# Patient Record
Sex: MALE | Race: WHITE | NOT HISPANIC OR LATINO | Employment: OTHER | URBAN - METROPOLITAN AREA
[De-identification: names, ages, dates, MRNs, and addresses within clinical notes are randomized per-mention and may not be internally consistent; named-entity substitution may affect disease eponyms.]

---

## 2017-02-10 ENCOUNTER — HOSPITAL ENCOUNTER (INPATIENT)
Facility: HOSPITAL | Age: 52
LOS: 7 days | Discharge: HOME | End: 2017-02-18
Attending: EMERGENCY MEDICINE | Admitting: INTERNAL MEDICINE
Payer: MEDICAID

## 2017-02-10 DIAGNOSIS — N17.9 ACUTE RENAL FAILURE, UNSPECIFIED ACUTE RENAL FAILURE TYPE: ICD-10-CM

## 2017-02-10 DIAGNOSIS — D68.9 COAGULOPATHY: ICD-10-CM

## 2017-02-10 DIAGNOSIS — E87.6 HYPOKALEMIA: ICD-10-CM

## 2017-02-10 DIAGNOSIS — D53.9 MACROCYTIC ANEMIA: ICD-10-CM

## 2017-02-10 DIAGNOSIS — F10.10 ETOH ABUSE: ICD-10-CM

## 2017-02-10 DIAGNOSIS — E55.9 VITAMIN D INSUFFICIENCY: ICD-10-CM

## 2017-02-10 DIAGNOSIS — K74.60 DECOMPENSATED HEPATIC CIRRHOSIS: ICD-10-CM

## 2017-02-10 DIAGNOSIS — D69.6 THROMBOCYTOPENIA, UNSPECIFIED: ICD-10-CM

## 2017-02-10 DIAGNOSIS — E83.52 HYPERCALCEMIA: ICD-10-CM

## 2017-02-10 DIAGNOSIS — K72.90 DECOMPENSATED HEPATIC CIRRHOSIS: ICD-10-CM

## 2017-02-10 DIAGNOSIS — K76.82 HEPATIC ENCEPHALOPATHY: ICD-10-CM

## 2017-02-10 DIAGNOSIS — E43 PROTEIN-CALORIE MALNUTRITION, SEVERE: ICD-10-CM

## 2017-02-10 DIAGNOSIS — E87.1 HYPONATREMIA: Primary | ICD-10-CM

## 2017-02-10 PROCEDURE — 93005 ELECTROCARDIOGRAM TRACING: CPT

## 2017-02-10 PROCEDURE — 96372 THER/PROPH/DIAG INJ SC/IM: CPT

## 2017-02-10 PROCEDURE — 96361 HYDRATE IV INFUSION ADD-ON: CPT

## 2017-02-10 PROCEDURE — 86701 HIV-1ANTIBODY: CPT

## 2017-02-10 PROCEDURE — 20600001 HC STEP DOWN PRIVATE ROOM

## 2017-02-10 PROCEDURE — 82962 GLUCOSE BLOOD TEST: CPT

## 2017-02-10 PROCEDURE — 99285 EMERGENCY DEPT VISIT HI MDM: CPT | Mod: 25

## 2017-02-10 PROCEDURE — 96368 THER/DIAG CONCURRENT INF: CPT

## 2017-02-10 PROCEDURE — 96366 THER/PROPH/DIAG IV INF ADDON: CPT

## 2017-02-10 PROCEDURE — 99285 EMERGENCY DEPT VISIT HI MDM: CPT | Mod: ,,, | Performed by: EMERGENCY MEDICINE

## 2017-02-10 PROCEDURE — 86803 HEPATITIS C AB TEST: CPT

## 2017-02-10 PROCEDURE — 96365 THER/PROPH/DIAG IV INF INIT: CPT

## 2017-02-10 NOTE — IP AVS SNAPSHOT
[facility]  [address]  [facility] [address]  Phone: [phone]           Patient Discharge Instructions     Our goal is to set you up for success. This packet includes information on your condition, medications, and your home care. It will help you to care for yourself so you don't get sicker and need to go back to the hospital.     Please ask your nurse if you have any questions.        There are many details to remember when preparing to leave the hospital. Here is what you will need to do:    1. Take your medicine. If you are prescribed medications, review your Medication List in the following pages. You may have new medications to  at the pharmacy and others that you'll need to stop taking. Review the instructions for how and when to take your medications. Talk with your doctor or nurses if you are unsure of what to do.     2. Go to your follow-up appointments. Specific follow-up information is listed in the following pages. Your may be contacted by a transition nurse or clinical provider about future appointments. Be sure we have all of the phone numbers to reach you, if needed. Please contact your provider's office if you are unable to make an appointment.     3. Watch for warning signs. Your doctor or nurse will give you detailed warning signs to watch for and when to call for assistance. These instructions may also include educational information about your condition. If you experience any of warning signs to your health, call your doctor.               [facility]  Unless otherwise directed by your provider, please contact [facility], our nurse care line that is available for 24/7 assistance.     [phone] (toll-free)    Registered nurses in the [facility] provide clinical advisement, health education, appointment booking, and other advisory services.                    ** Verify the list of medication(s) below is accurate and up  to date. Carry this with you in case of emergency. If your medications have changed, please notify your healthcare provider.             Medication List      START taking these medications        Additional Info                      ergocalciferol 50,000 unit Cap   Commonly known as:  ERGOCALCIFEROL   Quantity:  12 capsule   Refills:  6   Dose:  48101 Units    Last time this was given:  50,000 Units on 2/15/2017  8:25 AM   Instructions:  Take 1 capsule (50,000 Units total) by mouth every 30 days.     Begin Date    AM    Noon    PM    Bedtime       lactulose 20 gram/30 mL Soln   Commonly known as:  CHRONULAC   Quantity:  600 mL   Refills:  12   Dose:  20 g    Last time this was given:  20 g on 2/18/2017  8:54 AM   Instructions:  Take 30 mLs (20 g total) by mouth 3 (three) times daily as needed (to acheive 2-3 BM per day).     Begin Date    AM    Noon    PM    Bedtime       metoprolol tartrate 25 MG tablet   Commonly known as:  LOPRESSOR   Quantity:  30 tablet   Refills:  11   Dose:  12.5 mg    Last time this was given:  12.5 mg on 2/18/2017  8:55 AM   Instructions:  Take 0.5 tablets (12.5 mg total) by mouth 2 (two) times daily.     Begin Date    AM    Noon    PM    Bedtime       multivitamin tablet   Commonly known as:  THERAGRAN   Refills:  0   Dose:  1 tablet    Last time this was given:  1 tablet on 2/18/2017  8:56 AM   Instructions:  Take 1 tablet by mouth once daily.     Begin Date    AM    Noon    PM    Bedtime       tamsulosin 0.4 mg Cp24   Commonly known as:  FLOMAX   Quantity:  30 capsule   Refills:  11   Dose:  0.4 mg    Last time this was given:  0.4 mg on 2/18/2017  8:56 AM   Instructions:  Take 1 capsule (0.4 mg total) by mouth once daily.     Begin Date    AM    Noon    PM    Bedtime            Where to Get Your Medications      These medications were sent to [facility] [address] [place] [address], [place] - [address]  [address], [facility] [place] [place] [address]     Phone:  [phone]     ergocalciferol 50,000 unit Cap     "lactulose 20 gram/30 mL Soln    metoprolol tartrate 25 MG tablet    tamsulosin 0.4 mg Cp24         You can get these medications from any pharmacy     You don't need a prescription for these medications     multivitamin tablet                  Please bring to all follow up appointments:    1. A copy of your discharge instructions.  2. All medicines you are currently taking in their original bottles.  3. Identification and insurance card.    Please arrive 15 minutes ahead of scheduled appointment time.    Please call 24 hours in advance if you must reschedule your appointment and/or time.            Primary Diagnosis     Your primary diagnosis was:  Decompensated Hepatic Cirrhosis      Admission Information     Date & Time Provider Department CSN    2/10/2017 11:46 PM Daniela Whiteside MD [facility] [id number]      Care Providers     Provider Role Specialty Primary office phone    Daniela Whiteside MD Attending Provider Hospitalist [phone]    Daniela Whiteside MD Team Attending  Hospitalist [phone]      Your Vitals Were     BP Pulse Temp Resp Height Weight    95/58 (BP Location: Right arm, Patient Position: Lying, BP Method: Automatic) 104 98.8 °F (37.1 °C) (Oral) 16 5' 8" (1.727 m) 81.6 kg (180 lb)    SpO2 BMI             95% 27.37 kg/m2         Recent Lab Values     No lab values to display.      Pending Labs     Order Current Status    CBC auto differential Preliminary result    Culture, Anaerobe Preliminary result    Prepare Fresh Frozen Plasma 1 Unit Preliminary result    Prepare RBC 1 Unit Preliminary result      Allergies as of 2/18/2017     No Known Allergies      Advance Directives     An advance directive is a document which, in the event you are no longer able to make decisions for yourself, tells your healthcare team what kind of treatment you do or do not want to receive, or who you would like to make those decisions for you.  If you do not currently have an advance " directive, [facility] encourages you to create one.  For more information call:  [phone]), [phone] ([phone]),  or log on to [email or web address].        Smoking Cessation     If you would like to quit smoking:   You may be eligible for free services if you are a [place] resident and started smoking cigarettes before September 1, 1988.  Call the Smoking Cessation Trust ([facility]) toll free at [phone] [phone] or (829) 909-6518.   Call 2-933-QUIT-NOW if you do not meet the above criteria.            Language Assistance Services     ATTENTION: Language assistance services are available, free of charge. Please call [phone].      ATENCIÓN: Si habla zainab, tiene a loaiza disposición servicios gratuitos de asistencia lingüística. Llame al [phone].     CHÚ Ý: N?u b?n nói Ti?ng Vi?t, có các d?ch v? h? tr? ngôn ng? mi?n phí dành cho b?n. G?i s? [phone].        Blood Transfusion Reaction Signs and Symptoms     The blood you have received has been matched for you as carefully as possible. Most patients who receive a blood transfusion do not experience problems. However, there can be a delayed reaction that happens a few weeks after your blood transfusion. Contact your physician immediately if you experience any NEW SYMPTOMS listed below:     Fever greater than 100.4 degrees    Chills   Yellow color to your skin or eyes(Jaundice)   Back pain, chest pain, or pain at the infusion site   Weakness (more than usual)   Discomfort or uneasiness more than usual (Malaise)   Nausea or vomiting   Shortness of breath, wheezing, or coughing   Higher or lower blood pressure than normal   Skin rash, itching, skin redness, or localized swelling (example: hands or feet)   Urinating less than normal   Urine appears reddish or orange and is darker than normal      Remember that some these signs may already exist for you--such as having chronic back pain or high blood pressure. You only need  to look for and report to your doctor any new occurrences since your blood transfusion that are of concern.         [facility] complies with applicable Federal civil rights laws and does not discriminate on the basis of race, color, national origin, age, disability, or sex.

## 2017-02-11 PROBLEM — D68.9 COAGULOPATHY: Status: ACTIVE | Noted: 2017-02-11

## 2017-02-11 PROBLEM — E83.52 HYPERCALCEMIA: Status: ACTIVE | Noted: 2017-02-11

## 2017-02-11 PROBLEM — N17.9 ACUTE RENAL FAILURE: Status: ACTIVE | Noted: 2017-02-11

## 2017-02-11 PROBLEM — D64.9 ANEMIA: Status: ACTIVE | Noted: 2017-02-11

## 2017-02-11 PROBLEM — Z92.89 HISTORY OF RAPID STREP TEST: Status: ACTIVE | Noted: 2017-02-11

## 2017-02-11 PROBLEM — D53.9 MACROCYTIC ANEMIA: Status: ACTIVE | Noted: 2017-02-11

## 2017-02-11 PROBLEM — E87.6 HYPOKALEMIA: Status: ACTIVE | Noted: 2017-02-11

## 2017-02-11 PROBLEM — F10.10 ETOH ABUSE: Status: ACTIVE | Noted: 2017-02-11

## 2017-02-11 PROBLEM — K74.60 DECOMPENSATED HEPATIC CIRRHOSIS: Status: ACTIVE | Noted: 2017-02-11

## 2017-02-11 PROBLEM — K72.90 DECOMPENSATED HEPATIC CIRRHOSIS: Status: ACTIVE | Noted: 2017-02-11

## 2017-02-11 PROBLEM — E87.1 HYPONATREMIA: Status: ACTIVE | Noted: 2017-02-11

## 2017-02-11 PROBLEM — D69.6 THROMBOCYTOPENIA, UNSPECIFIED: Status: ACTIVE | Noted: 2017-02-11

## 2017-02-11 PROBLEM — K76.82 HEPATIC ENCEPHALOPATHY: Status: ACTIVE | Noted: 2017-02-11

## 2017-02-11 PROBLEM — E43 PROTEIN-CALORIE MALNUTRITION, SEVERE: Status: ACTIVE | Noted: 2017-02-11

## 2017-02-11 PROBLEM — E55.9 VITAMIN D INSUFFICIENCY: Status: ACTIVE | Noted: 2017-02-11

## 2017-02-11 LAB
25(OH)D3+25(OH)D2 SERPL-MCNC: 12 NG/ML
AFP SERPL-MCNC: 3.5 NG/ML
ALBUMIN SERPL BCP-MCNC: 1.5 G/DL
ALBUMIN SERPL BCP-MCNC: 1.6 G/DL
ALP SERPL-CCNC: 90 U/L
ALP SERPL-CCNC: 95 U/L
ALT SERPL W/O P-5'-P-CCNC: 27 U/L
ALT SERPL W/O P-5'-P-CCNC: 28 U/L
AMMONIA PLAS-SCNC: 52 UMOL/L
AMPHET+METHAMPHET UR QL: NEGATIVE
ANION GAP SERPL CALC-SCNC: 5 MMOL/L
ANION GAP SERPL CALC-SCNC: 6 MMOL/L
AST SERPL-CCNC: 65 U/L
AST SERPL-CCNC: 74 U/L
BARBITURATES UR QL SCN>200 NG/ML: NEGATIVE
BASOPHILS # BLD AUTO: 0.02 K/UL
BASOPHILS NFR BLD: 0.2 %
BENZODIAZ UR QL SCN>200 NG/ML: NEGATIVE
BILIRUB SERPL-MCNC: 3 MG/DL
BILIRUB SERPL-MCNC: 3.1 MG/DL
BILIRUB UR QL STRIP: NEGATIVE
BUN SERPL-MCNC: 24 MG/DL
BUN SERPL-MCNC: 25 MG/DL
BZE UR QL SCN: NEGATIVE
CA-I BLDV-SCNC: 1.36 MMOL/L
CALCIUM CREATININE RATIO: 0.03
CALCIUM SERPL-MCNC: 10.3 MG/DL
CALCIUM SERPL-MCNC: 10.4 MG/DL
CALCIUM UR-MCNC: 9.3 MG/DL
CANNABINOIDS UR QL SCN: NEGATIVE
CERULOPLASMIN SERPL-MCNC: 13 MG/DL
CHLORIDE SERPL-SCNC: 91 MMOL/L
CHLORIDE SERPL-SCNC: 93 MMOL/L
CLARITY UR REFRACT.AUTO: ABNORMAL
CO2 SERPL-SCNC: 31 MMOL/L
CO2 SERPL-SCNC: 31 MMOL/L
COLOR UR AUTO: ABNORMAL
CREAT SERPL-MCNC: 1.9 MG/DL
CREAT SERPL-MCNC: 2 MG/DL
CREAT UR-MCNC: 261 MG/DL
CREAT UR-MCNC: 261 MG/DL
CREAT UR-MCNC: 307 MG/DL
DIFFERENTIAL METHOD: ABNORMAL
EOSINOPHIL # BLD AUTO: 0.1 K/UL
EOSINOPHIL NFR BLD: 1.3 %
EOSINOPHIL URNS QL WRIGHT STN: NORMAL
ERYTHROCYTE [DISTWIDTH] IN BLOOD BY AUTOMATED COUNT: 20.7 %
EST. GFR  (AFRICAN AMERICAN): 43.1 ML/MIN/1.73 M^2
EST. GFR  (AFRICAN AMERICAN): 45.8 ML/MIN/1.73 M^2
EST. GFR  (NON AFRICAN AMERICAN): 37.3 ML/MIN/1.73 M^2
EST. GFR  (NON AFRICAN AMERICAN): 39.7 ML/MIN/1.73 M^2
FERRITIN SERPL-MCNC: 107 NG/ML
FOLATE SERPL-MCNC: 17.8 NG/ML
GLUCOSE SERPL-MCNC: 101 MG/DL
GLUCOSE SERPL-MCNC: 86 MG/DL
GLUCOSE UR QL STRIP: NEGATIVE
HCT VFR BLD AUTO: 24.4 %
HETEROPH AB SERPL QL IA: NEGATIVE
HGB BLD-MCNC: 8.8 G/DL
HGB UR QL STRIP: ABNORMAL
HIV1+2 IGG SERPL QL IA.RAPID: NEGATIVE
INR PPP: 2.4
INR PPP: 2.4
KETONES UR QL STRIP: NEGATIVE
LACTATE SERPL-SCNC: 2.7 MMOL/L
LEUKOCYTE ESTERASE UR QL STRIP: NEGATIVE
LIPASE SERPL-CCNC: 115 U/L
LYMPHOCYTES # BLD AUTO: 2.2 K/UL
LYMPHOCYTES NFR BLD: 20.6 %
MCH RBC QN AUTO: 38.3 PG
MCHC RBC AUTO-ENTMCNC: 36.1 %
MCV RBC AUTO: 106 FL
METHADONE UR QL SCN>300 NG/ML: NEGATIVE
MONOCYTES # BLD AUTO: 1 K/UL
MONOCYTES NFR BLD: 9.3 %
NEUTROPHILS # BLD AUTO: 7.3 K/UL
NEUTROPHILS NFR BLD: 67.8 %
NITRITE UR QL STRIP: NEGATIVE
OPIATES UR QL SCN: NEGATIVE
OSMOLALITY SERPL: 280 MOSM/KG
OSMOLALITY UR: 382 MOSM/KG
PCP UR QL SCN>25 NG/ML: NEGATIVE
PH UR STRIP: 5 [PH] (ref 5–8)
PHOSPHATE SERPL-MCNC: 3.4 MG/DL
PLATELET # BLD AUTO: 87 K/UL
PMV BLD AUTO: 10.3 FL
POCT GLUCOSE: 98 MG/DL (ref 70–110)
POTASSIUM SERPL-SCNC: 3 MMOL/L
POTASSIUM SERPL-SCNC: 3.1 MMOL/L
PROT SERPL-MCNC: 6.4 G/DL
PROT SERPL-MCNC: 6.7 G/DL
PROT UR QL STRIP: ABNORMAL
PROT UR-MCNC: 28 MG/DL
PROT/CREAT RATIO, UR: 0.11
PROTHROMBIN TIME: 23.7 SEC
PROTHROMBIN TIME: 23.9 SEC
PTH-INTACT SERPL-MCNC: 12 PG/ML
RBC # BLD AUTO: 2.3 M/UL
SODIUM SERPL-SCNC: 127 MMOL/L
SODIUM SERPL-SCNC: 130 MMOL/L
SODIUM UR-SCNC: <20 MMOL/L
SP GR UR STRIP: 1.02 (ref 1–1.03)
TOXICOLOGY INFORMATION: NORMAL
TRANSFERRIN SERPL-MCNC: 119 MG/DL
TSH SERPL DL<=0.005 MIU/L-ACNC: 1.4 UIU/ML
URN SPEC COLLECT METH UR: ABNORMAL
UROBILINOGEN UR STRIP-ACNC: NEGATIVE EU/DL
VIT B12 SERPL-MCNC: >2000 PG/ML
WBC # BLD AUTO: 10.7 K/UL

## 2017-02-11 PROCEDURE — 83605 ASSAY OF LACTIC ACID: CPT

## 2017-02-11 PROCEDURE — 82330 ASSAY OF CALCIUM: CPT

## 2017-02-11 PROCEDURE — 82390 ASSAY OF CERULOPLASMIN: CPT

## 2017-02-11 PROCEDURE — 25000003 PHARM REV CODE 250: Performed by: HOSPITALIST

## 2017-02-11 PROCEDURE — 82728 ASSAY OF FERRITIN: CPT

## 2017-02-11 PROCEDURE — 86256 FLUORESCENT ANTIBODY TITER: CPT

## 2017-02-11 PROCEDURE — 85610 PROTHROMBIN TIME: CPT

## 2017-02-11 PROCEDURE — 85610 PROTHROMBIN TIME: CPT | Mod: 91

## 2017-02-11 PROCEDURE — 80053 COMPREHEN METABOLIC PANEL: CPT

## 2017-02-11 PROCEDURE — 36415 COLL VENOUS BLD VENIPUNCTURE: CPT

## 2017-02-11 PROCEDURE — 84443 ASSAY THYROID STIM HORMONE: CPT

## 2017-02-11 PROCEDURE — 99233 SBSQ HOSP IP/OBS HIGH 50: CPT | Mod: ,,, | Performed by: INTERNAL MEDICINE

## 2017-02-11 PROCEDURE — 80074 ACUTE HEPATITIS PANEL: CPT

## 2017-02-11 PROCEDURE — 86308 HETEROPHILE ANTIBODY SCREEN: CPT

## 2017-02-11 PROCEDURE — 84466 ASSAY OF TRANSFERRIN: CPT

## 2017-02-11 PROCEDURE — 25000003 PHARM REV CODE 250: Performed by: INTERNAL MEDICINE

## 2017-02-11 PROCEDURE — 93010 ELECTROCARDIOGRAM REPORT: CPT | Mod: ,,, | Performed by: INTERNAL MEDICINE

## 2017-02-11 PROCEDURE — 82787 IGG 1 2 3 OR 4 EACH: CPT | Mod: 59

## 2017-02-11 PROCEDURE — 82340 ASSAY OF CALCIUM IN URINE: CPT

## 2017-02-11 PROCEDURE — 80053 COMPREHEN METABOLIC PANEL: CPT | Mod: 91

## 2017-02-11 PROCEDURE — 84156 ASSAY OF PROTEIN URINE: CPT

## 2017-02-11 PROCEDURE — 84300 ASSAY OF URINE SODIUM: CPT

## 2017-02-11 PROCEDURE — 82607 VITAMIN B-12: CPT

## 2017-02-11 PROCEDURE — 63600175 PHARM REV CODE 636 W HCPCS: Performed by: HOSPITALIST

## 2017-02-11 PROCEDURE — 87040 BLOOD CULTURE FOR BACTERIA: CPT | Mod: 59

## 2017-02-11 PROCEDURE — 25000003 PHARM REV CODE 250: Performed by: STUDENT IN AN ORGANIZED HEALTH CARE EDUCATION/TRAINING PROGRAM

## 2017-02-11 PROCEDURE — 80307 DRUG TEST PRSMV CHEM ANLYZR: CPT

## 2017-02-11 PROCEDURE — 87205 SMEAR GRAM STAIN: CPT

## 2017-02-11 PROCEDURE — 85025 COMPLETE CBC W/AUTO DIFF WBC: CPT

## 2017-02-11 PROCEDURE — 20600001 HC STEP DOWN PRIVATE ROOM

## 2017-02-11 PROCEDURE — 83690 ASSAY OF LIPASE: CPT

## 2017-02-11 PROCEDURE — 82140 ASSAY OF AMMONIA: CPT

## 2017-02-11 PROCEDURE — 99223 1ST HOSP IP/OBS HIGH 75: CPT | Mod: ,,, | Performed by: INTERNAL MEDICINE

## 2017-02-11 PROCEDURE — 83935 ASSAY OF URINE OSMOLALITY: CPT

## 2017-02-11 PROCEDURE — 84100 ASSAY OF PHOSPHORUS: CPT

## 2017-02-11 PROCEDURE — 81003 URINALYSIS AUTO W/O SCOPE: CPT

## 2017-02-11 PROCEDURE — 97802 MEDICAL NUTRITION INDIV IN: CPT

## 2017-02-11 PROCEDURE — 82105 ALPHA-FETOPROTEIN SERUM: CPT

## 2017-02-11 PROCEDURE — 82746 ASSAY OF FOLIC ACID SERUM: CPT

## 2017-02-11 PROCEDURE — 83930 ASSAY OF BLOOD OSMOLALITY: CPT

## 2017-02-11 PROCEDURE — 87081 CULTURE SCREEN ONLY: CPT

## 2017-02-11 PROCEDURE — 83970 ASSAY OF PARATHORMONE: CPT

## 2017-02-11 PROCEDURE — 82306 VITAMIN D 25 HYDROXY: CPT

## 2017-02-11 RX ORDER — ALBUMIN HUMAN 50 G/1000ML
25 SOLUTION INTRAVENOUS EVERY 8 HOURS
Status: DISCONTINUED | OUTPATIENT
Start: 2017-02-11 | End: 2017-02-12

## 2017-02-11 RX ORDER — LACTULOSE 10 G/15ML
20 SOLUTION ORAL 3 TIMES DAILY
Status: DISCONTINUED | OUTPATIENT
Start: 2017-02-11 | End: 2017-02-14

## 2017-02-11 RX ORDER — GLUCAGON 1 MG
1 KIT INJECTION
Status: DISCONTINUED | OUTPATIENT
Start: 2017-02-11 | End: 2017-02-18 | Stop reason: HOSPADM

## 2017-02-11 RX ORDER — POTASSIUM CHLORIDE 20 MEQ/15ML
60 SOLUTION ORAL ONCE
Status: DISCONTINUED | OUTPATIENT
Start: 2017-02-11 | End: 2017-02-11

## 2017-02-11 RX ORDER — POTASSIUM CHLORIDE 750 MG/1
10 CAPSULE, EXTENDED RELEASE ORAL DAILY
Status: DISCONTINUED | OUTPATIENT
Start: 2017-02-11 | End: 2017-02-14

## 2017-02-11 RX ORDER — LORAZEPAM 1 MG/1
1 TABLET ORAL EVERY 6 HOURS PRN
Status: DISCONTINUED | OUTPATIENT
Start: 2017-02-11 | End: 2017-02-11

## 2017-02-11 RX ORDER — HEPARIN SODIUM 5000 [USP'U]/ML
5000 INJECTION, SOLUTION INTRAVENOUS; SUBCUTANEOUS EVERY 8 HOURS
Status: DISCONTINUED | OUTPATIENT
Start: 2017-02-11 | End: 2017-02-11

## 2017-02-11 RX ORDER — LORAZEPAM 1 MG/1
1 TABLET ORAL EVERY 6 HOURS PRN
Status: COMPLETED | OUTPATIENT
Start: 2017-02-11 | End: 2017-02-16

## 2017-02-11 RX ORDER — FOLIC ACID 1 MG/1
1 TABLET ORAL DAILY
Status: DISCONTINUED | OUTPATIENT
Start: 2017-02-11 | End: 2017-02-18 | Stop reason: HOSPADM

## 2017-02-11 RX ORDER — POTASSIUM CHLORIDE 750 MG/1
50 CAPSULE, EXTENDED RELEASE ORAL DAILY
Status: DISCONTINUED | OUTPATIENT
Start: 2017-02-11 | End: 2017-02-14

## 2017-02-11 RX ORDER — IBUPROFEN 200 MG
24 TABLET ORAL
Status: DISCONTINUED | OUTPATIENT
Start: 2017-02-11 | End: 2017-02-18 | Stop reason: HOSPADM

## 2017-02-11 RX ORDER — IBUPROFEN 200 MG
16 TABLET ORAL
Status: DISCONTINUED | OUTPATIENT
Start: 2017-02-11 | End: 2017-02-18 | Stop reason: HOSPADM

## 2017-02-11 RX ORDER — MIDODRINE HYDROCHLORIDE 2.5 MG/1
2.5 TABLET ORAL 2 TIMES DAILY WITH MEALS
Status: DISCONTINUED | OUTPATIENT
Start: 2017-02-12 | End: 2017-02-12

## 2017-02-11 RX ORDER — CALCIUM CARBONATE 200(500)MG
200 TABLET,CHEWABLE ORAL
Status: COMPLETED | OUTPATIENT
Start: 2017-02-11 | End: 2017-02-11

## 2017-02-11 RX ORDER — CALCIUM CARBONATE 200(500)MG
500 TABLET,CHEWABLE ORAL DAILY PRN
Status: DISCONTINUED | OUTPATIENT
Start: 2017-02-11 | End: 2017-02-11

## 2017-02-11 RX ORDER — PANTOPRAZOLE SODIUM 40 MG/1
40 TABLET, DELAYED RELEASE ORAL DAILY
Status: DISCONTINUED | OUTPATIENT
Start: 2017-02-11 | End: 2017-02-18 | Stop reason: HOSPADM

## 2017-02-11 RX ORDER — ONDANSETRON 4 MG/1
4 TABLET, ORALLY DISINTEGRATING ORAL EVERY 8 HOURS PRN
Status: DISCONTINUED | OUTPATIENT
Start: 2017-02-11 | End: 2017-02-18 | Stop reason: HOSPADM

## 2017-02-11 RX ADMIN — FOLIC ACID 1 MG: 1 TABLET ORAL at 09:02

## 2017-02-11 RX ADMIN — SODIUM CHLORIDE 1000 ML: 0.9 INJECTION, SOLUTION INTRAVENOUS at 01:02

## 2017-02-11 RX ADMIN — LACTULOSE 20 G: 10 SOLUTION ORAL at 04:02

## 2017-02-11 RX ADMIN — THIAMINE HYDROCHLORIDE 100 MG: 100 INJECTION, SOLUTION INTRAMUSCULAR; INTRAVENOUS at 05:02

## 2017-02-11 RX ADMIN — LACTULOSE 20 G: 10 SOLUTION ORAL at 09:02

## 2017-02-11 RX ADMIN — CALCIUM CARBONATE (ANTACID) CHEW TAB 500 MG 500 MG: 500 CHEW TAB at 01:02

## 2017-02-11 RX ADMIN — POTASSIUM CHLORIDE 50 MEQ: 750 CAPSULE, EXTENDED RELEASE ORAL at 03:02

## 2017-02-11 RX ADMIN — PANTOPRAZOLE SODIUM 40 MG: 40 TABLET, DELAYED RELEASE ORAL at 10:02

## 2017-02-11 RX ADMIN — HEPARIN SODIUM 5000 UNITS: 5000 INJECTION, SOLUTION INTRAVENOUS; SUBCUTANEOUS at 05:02

## 2017-02-11 RX ADMIN — RIFAXIMIN 550 MG: 550 TABLET ORAL at 09:02

## 2017-02-11 RX ADMIN — LACTULOSE 20 G: 10 SOLUTION ORAL at 02:02

## 2017-02-11 RX ADMIN — POTASSIUM CHLORIDE 10 MEQ: 750 CAPSULE, EXTENDED RELEASE ORAL at 03:02

## 2017-02-11 RX ADMIN — THERA TABS 1 TABLET: TAB at 09:02

## 2017-02-11 RX ADMIN — CEFTRIAXONE 1 G: 1 INJECTION, SOLUTION INTRAVENOUS at 04:02

## 2017-02-11 NOTE — ASSESSMENT & PLAN NOTE
-Most likely 2/2 decompensated cirrhosis  -f/u serum osm, urine osm, urine sodium to determine hypo/hypero/iso-osmolar type of hyponatremia  -s/p IVF in ED, based on workup will determine fluid restriction vs diuresis strategy

## 2017-02-11 NOTE — ASSESSMENT & PLAN NOTE
-Unclear etiology, from limited history most likely 2/2 EtOH cirrhosis. Does not appear to have transaminitis elevation >1000k so tylenol, acute hep, shock, Budd Chiari less likely. Also consider autoimmune, morgan's, chronic viral hepatitis, hemachromatosis  -Hep panel pending  -IgG, anti smooth muscle ab, ceruloplasmin pending  -Ferritin pending  -US Liver w/ doppler pending  -Would like to perform paracentesis, unable to find family member or working number to obtain consent, currently no leukocytosis of fever  -Possibly acute strep throat could have precipitated decline, but other etiology more likely  -UA clear, CxR without acute cardiopulmonary abnormality, BCx pending  -CTX 1g q24h for SBP ppx  MELD-Na score: 31 at 2/11/2017 12:11 AM  MELD score: 27 at 2/11/2017 12:11 AM  Calculated from:  Serum Creatinine: 2.0 mg/dL at 2/11/2017 12:11 AM  Serum Sodium: 127 mmol/L at 2/11/2017 12:11 AM  Total Bilirubin: 3.1 mg/dL at 2/11/2017 12:11 AM  INR(ratio): 2.4 at 2/11/2017 12:11 AM  Age: 51 years  -Hepatology consult pending

## 2017-02-11 NOTE — NURSING
MD and team at bedside. Notified Attending MD and resident that pt is not tolerating the liquid PO potassium as ordered even when diluted in juice. Pt states that it irritates his throat and causes heartburn. Recommended micro-K capsules. Per MDs they will address issue when they are done in pt's room.

## 2017-02-11 NOTE — ED PROVIDER NOTES
Encounter Date: 2/10/2017       History     Chief Complaint   Patient presents with    Hyponatremia     pt transferred from [facility] for weakness, hyponatremia, HIV     Review of patient's allergies indicates:  Allergies not on file  HPI Comments: 52yo M with reported history of CVA, transferred from outside hospital for ICU evaluation, hepatology and ID evaluation. Per outside ED, pt presented with 3 days of generalized weakness. Pt does not offer history as to why he presented to ED this evening.  Outside hospital noted pt to be positive for rapid strep, elevated lactic acid, hyponatremic.  Pt does report being a daily drinker, a few beers a day, last drink weeks ago. He reports abdominal distention and leg swelling. Denies abdominal pain, nausea, vomiting, chest pain, shortness of breath. Per referring ED with h/o cirrhosis and HIV, but pt denies known HIV (+) status or any known diagnosed cause of liver dz    The history is provided by the EMS personnel. The history is limited by the condition of the patient.     Past Medical History   Diagnosis Date    Seizure disorder      No past medical history pertinent negatives.  Past Surgical History   Procedure Laterality Date    Right jaw       plate placement     History reviewed. No pertinent family history.  Social History   Substance Use Topics    Smoking status: Current Every Day Smoker     Packs/day: 0.50    Smokeless tobacco: None    Alcohol use Yes     Review of Systems   Constitutional: Negative for fever.   HENT: Negative for sore throat.    Respiratory: Negative for shortness of breath.    Cardiovascular: Positive for leg swelling. Negative for chest pain.   Gastrointestinal: Positive for abdominal distention and diarrhea. Negative for abdominal pain, nausea and vomiting.   Genitourinary: Negative for dysuria.   Musculoskeletal: Negative for back pain.   Skin: Negative for rash.   Neurological: Positive for weakness.   Hematological: Does not  bruise/bleed easily.       Physical Exam   Initial Vitals   BP Pulse Resp Temp SpO2   02/10/17 2341 02/10/17 2341 02/10/17 2341 -- 02/10/17 2341   90/60 96 18  99 %     Physical Exam    Constitutional: He appears well-developed. No distress.   Frail appearing   HENT:   Head: Normocephalic and atraumatic.   Mouth/Throat: Oropharynx is clear and moist.   Eyes: EOM are normal. Pupils are equal, round, and reactive to light.   Neck: Normal range of motion.   Cardiovascular: Regular rhythm and normal heart sounds.   No murmur heard.  tachycardic   Pulmonary/Chest: Breath sounds normal. No stridor. No respiratory distress. He has no wheezes. He has no rales.   Abdominal: Soft. Bowel sounds are normal. He exhibits distension. There is no tenderness. There is no rebound.   Musculoskeletal: He exhibits edema.   Lymphadenopathy:     He has no cervical adenopathy.   Neurological: He is alert.   Right facial droop. Moves all extremities. Oriented to self and location. Stated that year was 1997 and president is Kd. Mild asterixis         ED Course   Procedures  Labs Reviewed   CBC W/ AUTO DIFFERENTIAL - Abnormal; Notable for the following:        Result Value    RBC 2.30 (*)     Hemoglobin 8.8 (*)     Hematocrit 24.4 (*)      (*)     MCH 38.3 (*)     MCHC 36.1 (*)     RDW 20.7 (*)     Platelets 87 (*)     All other components within normal limits   COMPREHENSIVE METABOLIC PANEL - Abnormal; Notable for the following:     Sodium 127 (*)     Potassium 3.0 (*)     Chloride 91 (*)     CO2 31 (*)     BUN, Bld 25 (*)     Creatinine 2.0 (*)     Albumin 1.6 (*)     Total Bilirubin 3.1 (*)     AST 65 (*)     Anion Gap 5 (*)     eGFR if  43.1 (*)     eGFR if non  37.3 (*)     All other components within normal limits   LACTIC ACID, PLASMA - Abnormal; Notable for the following:     Lactate (Lactic Acid) 2.7 (*)     All other components within normal limits   LIPASE - Abnormal; Notable for the  following:     Lipase 115 (*)     All other components within normal limits   PROTIME-INR - Abnormal; Notable for the following:     Prothrombin Time 23.7 (*)     INR 2.4 (*)     All other components within normal limits   AMMONIA - Abnormal; Notable for the following:     Ammonia 52 (*)     All other components within normal limits   URINALYSIS - Abnormal; Notable for the following:     Color, UA Brown (*)     Appearance, UA Hazy (*)     Protein, UA Trace (*)     Occult Blood UA Trace (*)     All other components within normal limits   CULTURE, BLOOD   CULTURE, BLOOD   CULTURE, STREP A,  THROAT   TSH   OSMOLALITY   RAPID HIV   RAPID HIV    Narrative:     add on per Navjot Smith MD order Rapid HIV on 02/11/2017 @   1:46 AM   HEPATITIS C ANTIBODY   PHOSPHORUS   VITAMIN D   PHOSPHORUS   VALENZUELA'S STAIN, URINE RANDOM   SODIUM, URINE, RANDOM   OSMOLALITY, URINE RANDOM   PROTEIN / CREATININE RATIO, URINE   OSMOLALITY, URINE RANDOM    Narrative:     add on UREOS UNAR UROSM UPRCR orders [id number]   [id number] per Dr. Rafat Martines IV  02/11/2017  04:15    ALCOHOL,MEDICAL (ETHANOL)   DRUG SCREEN PANEL, URINE EMERGENCY   IGG 1, 2, 3, AND 4   IRON AND TIBC   FERRITIN   TRANSFERRIN   HEPATITIS PANEL, ACUTE   HEPATITIS B SURFACE ANTIGEN   CERULOPLASMIN   ANTI-SMOOTH MUSCLE ANTIBODY   FOLATE   PTH, INTACT   AFP TUMOR MARKER   VALENZUELA'S STAIN, URINE RANDOM    Narrative:     add on UREOS UNAR UROSM UPRCR orders [id number]   [id number] per Dr. Rafat Martines IV  02/11/2017  04:15    SODIUM, URINE, RANDOM    Narrative:     add on UREOS UNAR UROSM UPRCR orders [id number]   [id number] per Dr. Rafat Martines IV  02/11/2017  04:15    PROTEIN / CREATININE RATIO, URINE    Narrative:     add on UREOS UNAR UROSM UPRCR orders [id number]   [id number] per Dr. Rafat Martines IV  02/11/2017  04:15    VITAMIN D    Narrative:     add on per Navjot Smith MD order Rapid HIV on  02/11/2017 @   1:46 AM  add on VID25 PHOS orders [id number] per Dr. Rafat Martines IV    02/11/2017  04:44    PHOSPHORUS    Narrative:     add on per Navjot Smith MD order Rapid HIV on 02/11/2017 @   1:46 AM  add on VID25 PHOS orders [id number] per Dr. Rafat Martines IV    02/11/2017  04:44    POCT GLUCOSE MONITORING CONTINUOUS                   APC / Resident Notes:   Reported h/o CVA, HIV, cirrhosis, transferred after he presented there with weakness.  DDx: decompensated alcoholic cirrhosis, hepatic encephalopathy, sepsis, alcohol withdrawal  - labs remarkable at outside ED for elevated lactic acid, hyponatremia, hypoalbuminemia, positive rapid strep  - given dose of vancomycin and rocephin at outside hospital  - pt transferred for ICU evaluation, ID and hepatology. BP en route with SBP 80-90s  - spoke with ICU, will evaluate patient. Will repeat labs and lactate.  Jv Naik MD  Pager: [phone]  02/11/2017  1:06 AM    Update  Repat lactate 2.7 (from 3.4), Na 127, Cr 2 with unknown baseline, ammonia 52.  - ICU team evaluated pt, feel he is stable for the floor, will consult internal medicine for admit for decompensated cirrhosis. Recommend tx of hepatic encephalopathy, continue Rocephin to cover any SBP, and hepatology consult in AM. Since unclear HIV status will repeat now.  Jv Naik MD  Pager: [phone]  02/11/2017  2:23 AM    Update  - Internal medicine to admit patient  Jv Naik MD  Internal and Emergency Medicine HO-3  Pager: [phone]  02/11/2017  2:35 AM                 Attending Attestation:   Physician Attestation Statement for Resident:  As the supervising MD   Physician Attestation Statement: I have personally seen and examined this patient.   I agree with the above history. -:   As the supervising MD I agree with the above PE.    As the supervising MD I agree with the above treatment, course, plan, and disposition.   -:     Transferred for ICU evaluation after presented to  referring ED with gen weakness, found to have hyponatremia and likely cirrhosis, most likely due to EtOH abuse. Also unclear HIV status, reportedly (+) per referring hospital but pt denies and rapid HIV (-) here. Seen by ICU and stable for floor admission, treated for hepatic encephalopathy                    ED Course     Clinical Impression:   The primary encounter diagnosis was Decompensated hepatic cirrhosis. A diagnosis of Hyponatremia was also pertinent to this visit.          Jv Naik MD  Resident  02/11/17 0235       Navjot Smith MD  02/11/17 [id number]

## 2017-02-11 NOTE — CONSULTS
Inpatient consult to Nephrology  Consult performed by: DOLORES HATHAWAY  Consult ordered by: ALIE GAN IV  Assessment/Recommendations: Consult acknowledged, chart reviewed, full note and recomendations to follow

## 2017-02-11 NOTE — NURSING TRANSFER
Nursing Transfer Note      2/11/2017     Transfer To: ultrasound    Transfer via stretcher    Transfer with cardiac monitoring    Transported by transport staff    Medicines sent: none    Chart send with patient: Yes    Notified: RN, telemetry staff

## 2017-02-11 NOTE — CONSULTS
Reason for Consult: decompensated cirrhosis   HPI:  Ricky Parsons is a 51 y.o. gentleman with PMH of CVA , wheelchair bound, EtOH abuse (about 6 beers/day but reports last drink 3 weeks ago) who [place] [facility] [place] [facility] for hepatology evaluation.  Per report pt presented to OSH after a fall from chair and generalized weakness. Was found to be disoriented with abdominal distension with ascites.  Pt was noted to be hyponatremic with a lactic acidosis to 3.3 there but hemodynamically stable. Denies taking any medications at home.  Per OSH ED notes was seeing bugs on sheets.      OSH records are significant for  ABG 7.57/79.1/29.5. Rapid strep positive, troponin negative, EtOH negative, CPK 80. UDS negative. Upon arrival pt was noted to be encephalopathic with hyponatremia and ARF. Pt started on lactulose and rifaximin. CT head was done that showed no  evidence of acute intracranial abnormality, Generalized cerebral volume loss, the degree of which is greater than anticipated for the patient's reported chronologic age.       On interview pt is delirious and unable to provide history, wife cannot be reached by phone. All history is obtained from chart check in epic          Review of Systems:  Unable to assess 2/2 AMS    Past Medical History   Diagnosis Date    Seizure disorder        Past Surgical History   Procedure Laterality Date    Right jaw       plate placement       History reviewed. No pertinent family history.    Review of patient's allergies indicates:  No Known Allergies    Social History     Social History    Marital status:      Spouse name: N/A    Number of children: N/A    Years of education: N/A     Social History Main Topics    Smoking status: Current Every Day Smoker     Packs/day: 0.50    Smokeless tobacco: None    Alcohol use Yes    Drug use: No    Sexual activity: Not Asked     Other Topics Concern    None     Social History Narrative     None       Medications:     cefTRIAXone (ROCEPHIN) IVPB  1 g Intravenous Q24H    folic acid  1 mg Oral Daily    heparin (porcine)  5,000 Units Subcutaneous Q8H    lactulose  20 g Oral TID    multivitamin  1 tablet Oral Daily    potassium chloride 10%  60 mEq Oral Once    rifAXIMimin  200 mg Oral BID    thiamine (VITAMIN B1) IVPB  100 mg Intravenous Q24H       Physical exam:  General appearance: appears older than stated age and delirious  Eyes: some scleral icterus   Lungs: rales anteriorly  Heart: regular rate and rhythm, S1, S2 normal, no murmur, click, rub or gallop  Abdomen: distended abdomen, BS normal, no tenderness  Extremities: LE edema +2      Recent Labs  Lab 02/11/17  0010   WBC 10.70   RBC 2.30*   HGB 8.8*   HCT 24.4*   PLT 87*   *   MCH 38.3*   MCHC 36.1*       Recent Labs  Lab 02/11/17  0011   CALCIUM 10.4   PROT 6.7   *   K 3.0*   CO2 31*   CL 91*   BUN 25*   CREATININE 2.0*   ALKPHOS 95   ALT 28   AST 65*   BILITOT 3.1*       Recent Labs  Lab 02/11/17  0011   INR 2.4*       MELD-Na score: 31 at 2/11/2017 12:11 AM  MELD score: 27 at 2/11/2017 12:11 AM  Calculated from:  Serum Creatinine: 2.0 mg/dL at 2/11/2017 12:11 AM  Serum Sodium: 127 mmol/L at 2/11/2017 12:11 AM  Total Bilirubin: 3.1 mg/dL at 2/11/2017 12:11 AM  INR(ratio): 2.4 at 2/11/2017 12:11 AM  Age: 51 years    Microbiology Results (last 7 days)     Procedure Component Value Units Date/Time    Strep A culture, throat [[id number]] Collected:  02/11/17 0447    Order Status:  Sent Specimen:  Throat from Throat Updated:  02/11/17 0507    Blood culture #2 **CANNOT BE ORDERED STAT** [[id number]] Collected:  02/11/17 0011    Order Status:  Sent Specimen:  Blood from Peripheral, Hand, Left Updated:  02/11/17 0054    Blood culture #1 **CANNOT BE ORDERED STAT** [[id number]] Collected:  02/11/17 0011    Order Status:  Sent Specimen:  Blood from Peripheral, Forearm, Right Updated:  02/11/17 0052        Imaging:  CT head   No CT  evidence of acute intracranial abnormality.  Generalized cerebral volume loss, the degree of which is greater than anticipated for the patient's reported chronologic age.    cxr  The cardiomediastinal silhouette with is not enlarged.  There is elevation of the bilateral hemidiaphragms.  There is no pleural effusion.  The trachea is midline.  The lungs are symmetrically expanded bilaterally with mildly coarse interstitial attenuation, likely on the basis of shallow inspiratory effort, differential would include minimal edema. No large focal consolidation seen.  There is no pneumothorax.  The osseous structures are remarkable for degenerative changes of the spine and shoulders.    Assessment:  Ricky Parsons is a 51 y.o. gentleman with history of CVA (wheelchair bound) , alcohol abuse ( 6 beer daily) who is admitted to hospital for liver evaluation with anticipation of possible liver cirrhosis with his current presentation. No previous documented  Diagnosis. MELD score is 31     Recs:  - please obtain OSH records  - obtain diagnostic paracentesis and send studies for WBC count with diff, albumin, total protein, and cultures   - continue lactulose and adjust to 3-4 BM daily and continue rifaximin   - check CMP and INR at least BID   - FU hepatitis panel   -tox screen and HIV negative , AFP 3.5  - check ferritin, iron, TIBC, iron saturation   - limit sedating medications   - Only give FFP or Platelets for invasive procedures or active bleeding, and Vitamin K for reversing coagulopathy  - place patient on PPI for stress ulcer prophylaxis   - continue to monitor pt for sign of alcohol withdrawal   - US of liver pending         Candie Lopez MD    Staff attestation to follow

## 2017-02-11 NOTE — CONSULTS
Critical Care Medicine  Consult Note    Consult Requested By: Dr. Navjot Smith MD  Reason for Consult: Decompensated liver cirrhosis    SUBJECTIVE:   Admit date: 2/10/2017     History of Present Illness:   Patient is a 51 y.o. male w/past medical hx of previous CVA, TIA, HIV (patient unaware of status, noted in chart on OSH records), (no mention of liver cirrhosis?) presented to the [facility] complaining of generalized weakness for the last 2-3 days and after sustaining a fall from his wheelchair earlier that day. On arrival to the ED, several labs were obtained after a PEX exam revealed that the patient was altered but oriented to self, not time or place. His abdomen was distended and was found to have ascites and 2+ pitting edema and the patient was transferred to the [facility] [place]. His lactic acid in their ED was 3.3 and his BP's have been stable at MAP>65. He was found to be hyponatermic at 127 and his albumin was 1.7. His ammonia was 51 and his Cr was 2. He was given ceftriaxone before transfer. The patient was accepted here and upon arrival warrented an ICU consultation in the ED before potential admission the floor.    In the ED here, the patient is rested comfortably and sating the 100's on RA. He is alert but not oriented to place or time or situation. The patient only complains of being cold and heartburn, noting that his sensation of heartburn is heartburn and not CP. Denies taking lactulose. Repeat lactic acid is 2.7.    Patient states that he drinks 6 pack every other day. He does not know when his last drink was. He states that he does not take any medications.    Review of Systems:  · Constitution: endorses fatigue and chills  · HEENT: denies congestion, discharge  · Pulm: denies SOB, or cough  · CV: denies CP, palpitations, endorses leg swelling  · GI: denies abdominal pain, N or vomitting, or diarrhea.   · : denies frequency or dysuria  · MSK/skin/hair/nails:  denies arthralgias, myalgias,   · Neuro: denies HA or syncope         (Not in a hospital admission)   Review of patient's allergies indicates:  No Known Allergies    History reviewed. No pertinent past medical history.    History reviewed. No pertinent past surgical history.   History reviewed. No pertinent family history.    Social History   Substance Use Topics    Smoking status: Unknown If Ever Smoked    Smokeless tobacco: None    Alcohol use Yes        OBJECTIVE:     Vital Signs (Most Recent)   Pulse: (!) 116 (02/11/17 0031)  Resp: 16 (02/11/17 0031)  BP: 97/66 (02/11/17 0031)  SpO2: 97 % (02/11/17 0031)  Body mass index is 27.37 kg/(m^2).      Physical Exam:   · Constitutional: Oriented to person not time or place. Not in acute distress  · Neurological/Psych: AAO x 1, no focal deficits present, GCS 15. Mild 4/5 weakness in his LE bilaterally.  · HEENT/Neck: NC/AT, PERRL, EOMI, no scleral icterus, OP clear, MMM w/o exudates, neck supple, no tracheal deviation, no stridor  · Cardiovascular: RRR, normal S1/S2, intact distant pulses, positive JVD 8 cm  · Pulmonary/Chest wall: CTAB, no w/r/r, no crackles  · GI: abdomen distended and non-tender, positive fluid wave, BS normoactive, mild asterix's  · Musculoskeletal/Skin: Normal ROM, 2+ pitting edema noted bilaterally.    Cardiac Enzymes: Ejection Fractions:    No results for input(s): CPK, CPKMB, MB, TROPONINI in the last 72 hours. No results found for: EF     Chemistries:     Recent Labs  Lab 02/11/17 0011   *   K 3.0*   CL 91*   CO2 31*   BUN 25*   CREATININE 2.0*   CALCIUM 10.4   PROT 6.7   BILITOT 3.1*   ALKPHOS 95   ALT 28   AST 65*        CBC/Anemia Labs: Coags:      Recent Labs  Lab 02/11/17 0010   WBC 10.70   HGB 8.8*   HCT 24.4*   PLT 87*   *   RDW 20.7*      Recent Labs  Lab 02/11/17 0011   INR 2.4*        POCT Glucose: HbA1c:    No results for input(s): POCTGLUCOSE in the last 168 hours. No results found for: HGBA1C      Lines/Drains:  Peripheral access in Antecubital left arm       ASSESSMENT/PLAN:     Patient is a 51 y.o. male w/past medical hx of previous CVA, TIA, HIV, (no mention of liver cirrhosis?) presents to the [facility] for decompensated liver cirrhosis with ascites and acute hepatic encephalopathy. Likely etiology is alcoholic cirrhosis. Would recommend workup for the etiology behind his decompensated cirrhosis with the recommendations below. Stable for admission to the floor at this point as he is able to protect his airway and MAP>65.    Recommendations:  - Would continue ceftriaxone for potential SBP. No WCC and would recommend a therapeutic tap.  - Positive HE on exam, would order Lactulose and rifaxamin for HE  - Would hold off on fluid resuscitation at this time and would entertain diuresis as patient is hypervolemic vs 3rd spacing.  - Hyponatemia secondary to liver cirrhosis, also would recommend fluid restriction for this currently.  - Unclear baseline Cr function. Currently at Cr of 2. Would recommend trial of albumin if UOP is decreased to elucidate if underlying etiology is hepatorenal syndrome  - Please order acute hep panel, ceruloplasmin, Anti-smooth muscle Ab, AFP   - Currently no signs of variceal bleed so no need for octreotide  - US doppler Xpd complete to r/o thrombosis.   - Please order Folate levels and please give thiamine replacement  - Hypercalcemic on admit. Would recommend PTH, PTHrP and Vitamin D 1,25 hydroxy.  - Strep swap postive at OSH ED? Would recommend one here and treating?  - Replace K  - Would recommend Hepatology consultation in the AM  - Of note patient drinks 6 beers every other day, alcohol on admission was negative. Please monitor for withdrawls.    Discussed case with pulm critical fellow, Dr. Lagunas,     Chio Arriaga MD, PGY-3

## 2017-02-11 NOTE — ED TRIAGE NOTES
Transfer from [facility] for weakness and hyponatremia, pt is only oriented to person, denies any pain, discomfort or unusual symptoms currently or recently.

## 2017-02-11 NOTE — ED NOTES
Pt resting on stretcher with eyes closed, respirations even and unlabored, cardiac monitor in place, no distress noted. Will continue to monitor.

## 2017-02-11 NOTE — PLAN OF CARE
Problem: Patient Care Overview  Goal: Plan of Care Review  Outcome: Ongoing (interventions implemented as appropriate)  Plan of care discussed with patient. Patient is free of fall/trauma/injury, wearing fall alert wrist band with bed alarm on at all times. Denies CP, SOB, or pain/discomfort.Pt is alert to self only, asks the same questions repeatedly and is unable to communicate understanding. Will continue to monitor.

## 2017-02-11 NOTE — CONSULTS
[facility]  Adult Nutrition  Consult Note    SUMMARY     Recommendations    1. When medically able, advance diet as tolerated to 2 gram sodium.   2. Upon diet advancement, add Boost Plus TID.   3. RD to monitor & follow-up.    Goals: PO intake >50%  Nutrition Goal Status: new  Communication of RD Recs: reviewed with RN    Reason for Assessment    Reason for Assessment: physician consult  Diagnosis: other (see comments) (Cirrhosis)  Relevent Medical History: Seizure disorder   Interdisciplinary Rounds: did not attend     General Information Comments: Pt currently NPO for US. Disoriented during visit.   Discharge planning: adequate PO intake    Nutrition Prescription Ordered    Current Diet Order: NPO      Nutrition Risk Screen     Nutrition Risk Screen: no indicators present    Nutrition/Diet History    Patient Reported Diet/Restrictions/Preferences: other (see comments) (ARCHIE)     Factors Affecting Nutritional Intake: NPO    Labs/Tests/Procedures/Meds    Pertinent Labs Reviewed: reviewed, pertinent  Pertinent Labs Comments: Na 127, Creat 2.0, GFR 43.1, Bili 3.1  Pertinent Medications Reviewed: reviewed, pertinent  Pertinent Medications Comments: MVI, Folic acid, Thiamine    Physical Findings    Overall Physical Appearance: edematous  Oral/Mouth Cavity: WDL  Skin: intact    Anthropometrics    Height (inches): 67.99 in  Weight Method: Estimated  Weight (kg): 81.6 kg    Ideal Body Weight (IBW), Male: 153.94 lb  % Ideal Body Weight, Male (lb): 116.86 lb     BMI (kg/m2): 27.36  BMI Grade: 25 - 29.9 - overweight    Estimated/Assessed Needs    Weight Used For Calorie Calculations: 81.6 kg (179 lb 14.3 oz)   Height (cm): 172.7 cm     Energy Need Method: [place], other (see comments) (8288-7926 kcal/d)     Weight Used For Protein Calculations: 81.6 kg (179 lb 14.3 oz)  Protein Requirements: 82-90 g/d    Fluid Need Method: RDA Method, other (see comments) (Per MD)    Monitor and Evaluation    Food  and Nutrient Intake: energy intake, food and beverage intake  Food and Nutrient Adminstration: diet order     Physical Activity and Function: nutrition-related ADLs and IADLs  Anthropometric Measurements: weight, weight change, body mass index  Biochemical Data, Medical Tests and Procedures: electrolyte and renal panel, glucose/endocrine profile, gastrointestinal profile, inflammatory profile, lipid profile  Nutrition-Focused Physical Findings: overall appearance    Nutrition Risk    Level of Risk: high    Nutrition Follow-Up    RD Follow-up?: Yes

## 2017-02-11 NOTE — H&P
[facility] Hospital Medicine  History & Physical    Patient Name: Ricky Parsons  MRN: [id number]  Admission Date: 2/10/2017  Attending Physician: Navjot Smith MD   Primary Care Provider: [facility] Medicine Team: [facility] 4 Rafat Martines IV, MD     Patient information was obtained from patient and Outside Medical Records.     Subjective:     Principal Problem:Decompensated hepatic cirrhosis    Chief Complaint:   Chief Complaint   Patient presents with    Hyponatremia     pt transferred from [facility] for weakness, hyponatremia, HIV        HPI: Mr. Parsons is a 52 yo male no reported PMH per pt, per OSH records PMH CVA and wheelchair bound who presents as a transfer from [facility] per documentation there after a fall from chair and generalized weakness. Was found to be disoriented with abdominal distension and ascites so [place] [facility] [place] Found to be hyponatremic with a lactic acidosis to 3.3 there but hemodynamically stable. On arrival noted to have stable vital signs and evaluated by ICU. He reported then being cold and having heartburn but currently has no complaints and is significantly drowsy. Denies taking any medications at home. Consumes about 6 beers/day but does not know last drink. Per OSH ED notes was seeing bugs on sheets.     Notable ED studies from [place], ABG 7.57/79.1/29.5. Rapid strep positive, troponin negative, EtOH negative, CPK 80. UDS negative. Attempted to contact Ms. Sher (listed in demographics as emergency contact) and number disconnected. No number for his spouse is provided in transfer documentation.        Past Medical History   Diagnosis Date    Seizure disorder        Past Surgical History   Procedure Laterality Date    Right jaw       plate placement       Review of patient's allergies indicates:  No Known Allergies    No current facility-administered medications on file prior  to encounter.      No current outpatient prescriptions on file prior to encounter.     Family History     None        Social History Main Topics    Smoking status: Current Every Day Smoker     Packs/day: 0.50    Smokeless tobacco: Not on file    Alcohol use Yes    Drug use: No    Sexual activity: Not on file     Review of Systems   Unable to perform ROS: Mental status change     Objective:     Vital Signs (Most Recent):  Pulse: (!) 116 (02/11/17 0216)  Resp: 18 (02/11/17 0216)  BP: 103/65 (02/11/17 0216)  SpO2: 99 % (02/11/17 0216) Vital Signs (24h Range):  Pulse:  [] 116  Resp:  [15-21] 18  SpO2:  [97 %-100 %] 99 %  BP: ()/(57-69) 103/65     Weight: 81.6 kg (180 lb)  Body mass index is 27.37 kg/(m^2).    Physical Exam   Constitutional: He appears lethargic. He appears ill. No distress.   HENT:   Head: Normocephalic and atraumatic.   Mouth/Throat: Mucous membranes are pale.   Eyes: EOM are normal. Scleral icterus is present.   Neck: Normal range of motion. Neck supple.   Cardiovascular: Normal rate, regular rhythm and intact distal pulses.    No murmur heard.  Pulmonary/Chest: Effort normal and breath sounds normal. No respiratory distress. He has no wheezes. He has no rales.   Abdominal: Soft. Bowel sounds are normal. He exhibits distension, fluid wave and ascites. There is hepatomegaly. There is no tenderness. There is no rebound and no guarding.   Musculoskeletal: He exhibits edema (severe TIARA edema to buttocks).   Neurological: He appears lethargic. He is disoriented. GCS eye subscore is 3. GCS verbal subscore is 4. GCS motor subscore is 6.   Equivocal asterixis due to participation   Skin: Skin is warm and dry.   Diffusely jaundiced   Psychiatric: His speech is slurred. Cognition and memory are impaired. He is inattentive.        Significant Labs:   CBC:   Recent Labs  Lab 02/11/17  0010   WBC 10.70   HGB 8.8*   HCT 24.4*   PLT 87*     CMP:   Recent Labs  Lab 02/11/17  0011   *   K 3.0*    CL 91*   CO2 31*      BUN 25*   CREATININE 2.0*   CALCIUM 10.4   PROT 6.7   ALBUMIN 1.6*   BILITOT 3.1*   ALKPHOS 95   AST 65*   ALT 28   ANIONGAP 5*   EGFRNONAA 37.3*     Significant Imaging: I have reviewed all pertinent imaging results/findings within the past 24 hours.    Assessment/Plan:     * Decompensated hepatic cirrhosis  -Unclear etiology, from limited history most likely 2/2 EtOH cirrhosis. Does not appear to have transaminitis elevation >1000k so tylenol, acute hep, shock, Budd Chiari less likely. Also consider autoimmune, morgan's, chronic viral hepatitis, hemachromatosis  -Hep panel pending  -IgG, anti smooth muscle ab, ceruloplasmin pending  -Ferritin pending  -US Liver w/ doppler pending  -Would like to perform paracentesis, unable to find family member or working number to obtain consent, currently no leukocytosis of fever  -Possibly acute strep throat could have precipitated decline, but other etiology more likely  -UA clear, CxR without acute cardiopulmonary abnormality, BCx pending  -CTX 1g q24h for SBP ppx  MELD-Na score: 31 at 2/11/2017 12:11 AM  MELD score: 27 at 2/11/2017 12:11 AM  Calculated from:  Serum Creatinine: 2.0 mg/dL at 2/11/2017 12:11 AM  Serum Sodium: 127 mmol/L at 2/11/2017 12:11 AM  Total Bilirubin: 3.1 mg/dL at 2/11/2017 12:11 AM  INR(ratio): 2.4 at 2/11/2017 12:11 AM  Age: 51 years  -Hepatology consult pending      Hyponatremia  -Most likely 2/2 decompensated cirrhosis  -f/u serum osm, urine osm, urine sodium to determine hypo/hypero/iso-osmolar type of hyponatremia  -s/p IVF in ED, based on workup will determine fluid restriction vs diuresis strategy       ARF (acute renal failure)  -Cr. 2.0 on unknown baseline, unknown etiology possibly pre-renal vs ATN vs HRS  -Urine sodium, urine protein/creatinine ratio pending  -Retroperitoneal US pending  -UA unremarkable  -Monitor response after 1L IVF in ED, based on that and workup consider diuresis vs restriction  -Would  hold off HRS protocol now unless rapidly progresses or another diagnosis is not found  -Nephrology consulted, appreciate help    Hepatic encephalopathy  -Grade II-III, ammonia elevated, likely 2/2 decompensated cirrhosis  -Lactulose TID, rifaximin  -Seizure precautions, fall precautions, aspiration precautions  -Neurochecks q4h  -Head CT pending to evaluate for cerebral edema    Hypercalcemia  -Unclear etiology, PTH, phos and Vit D pending to help guide differential      Coagulopathy  -Likely 2/2 liver dz, no evidence of bleeding so will hold Vit K      Thrombocytopenia, unspecified  -Likely 2/2 liver dz, continue to monitor, no indication for transfusion      Hypokalemia  -Replace      Macrocytic anemia  -Most likely nutrient deficiency vs chronic dz  -Iron studies pending  -Vit B12, folate pending      History of rapid strep test  -Rapid strep positive at OSH  -Will get strep Cx here  -CTX should provide good coverage if true positive      Protein-calorie malnutrition, severe  -Likely 2/2 malnutrition from EtOH and liver dz  -Nutrition consulted      ETOH abuse  -Reports heavy EtOH consumption, unsure of last drink  -Thiamine, folate, multivitamin  -Monitor for withdrawal symptoms      VTE Risk Mitigation         Ordered     heparin (porcine) injection 5,000 Units  Every 8 hours     Route:  Subcutaneous        02/11/17 0343     Medium Risk of VTE  Once      02/11/17 0343        Rafat Martines IV, MD  Department of Hospital Medicine   [facility]

## 2017-02-11 NOTE — ASSESSMENT & PLAN NOTE
-Reports heavy EtOH consumption, unsure of last drink  -Thiamine, folate, multivitamin  -Monitor for withdrawal symptoms

## 2017-02-11 NOTE — PLAN OF CARE
Problem: Patient Care Overview  Goal: Plan of Care Review  Outcome: Ongoing (interventions implemented as appropriate)  1. When medically able, advance diet as tolerated to 2 gram sodium.   2. Upon diet advancement, add Boost Plus TID.   3. RD to monitor & follow-up.

## 2017-02-11 NOTE — ASSESSMENT & PLAN NOTE
-Rapid strep positive at OSH  -Will get strep Cx here  -CTX should provide good coverage if true positive

## 2017-02-12 PROBLEM — E87.6 HYPOKALEMIA: Status: RESOLVED | Noted: 2017-02-11 | Resolved: 2017-02-12

## 2017-02-12 PROBLEM — E44.0 MODERATE PROTEIN-CALORIE MALNUTRITION: Status: ACTIVE | Noted: 2017-02-12

## 2017-02-12 LAB
ALBUMIN SERPL BCP-MCNC: 2.1 G/DL
ALP SERPL-CCNC: 103 U/L
ALT SERPL W/O P-5'-P-CCNC: 30 U/L
ANION GAP SERPL CALC-SCNC: 8 MMOL/L
AST SERPL-CCNC: 88 U/L
BASOPHILS # BLD AUTO: 0.02 K/UL
BASOPHILS NFR BLD: 0.2 %
BILIRUB SERPL-MCNC: 3.1 MG/DL
BUN SERPL-MCNC: 30 MG/DL
CALCIUM SERPL-MCNC: 10.4 MG/DL
CHLORIDE SERPL-SCNC: 96 MMOL/L
CO2 SERPL-SCNC: 27 MMOL/L
CREAT SERPL-MCNC: 2.3 MG/DL
DIFFERENTIAL METHOD: ABNORMAL
EOSINOPHIL # BLD AUTO: 0.1 K/UL
EOSINOPHIL NFR BLD: 0.6 %
ERYTHROCYTE [DISTWIDTH] IN BLOOD BY AUTOMATED COUNT: 20.7 %
EST. GFR  (AFRICAN AMERICAN): 36.4 ML/MIN/1.73 M^2
EST. GFR  (NON AFRICAN AMERICAN): 31.5 ML/MIN/1.73 M^2
GLUCOSE SERPL-MCNC: 83 MG/DL
HCT VFR BLD AUTO: 21.7 %
HGB BLD-MCNC: 7.8 G/DL
INR PPP: 2.6
LYMPHOCYTES # BLD AUTO: 2.6 K/UL
LYMPHOCYTES NFR BLD: 19.6 %
MAGNESIUM SERPL-MCNC: 1.4 MG/DL
MCH RBC QN AUTO: 39.8 PG
MCHC RBC AUTO-ENTMCNC: 35.9 %
MCV RBC AUTO: 111 FL
MONOCYTES # BLD AUTO: 1 K/UL
MONOCYTES NFR BLD: 7.8 %
NEUTROPHILS # BLD AUTO: 9.3 K/UL
NEUTROPHILS NFR BLD: 70.7 %
PHOSPHATE SERPL-MCNC: 3.2 MG/DL
PLATELET # BLD AUTO: 68 K/UL
PMV BLD AUTO: 10.5 FL
POCT GLUCOSE: 108 MG/DL (ref 70–110)
POCT GLUCOSE: 91 MG/DL (ref 70–110)
POCT GLUCOSE: 97 MG/DL (ref 70–110)
POTASSIUM SERPL-SCNC: 3.9 MMOL/L
PROT SERPL-MCNC: 6.5 G/DL
PROTHROMBIN TIME: 25.8 SEC
RBC # BLD AUTO: 1.96 M/UL
SODIUM SERPL-SCNC: 131 MMOL/L
WBC # BLD AUTO: 13.13 K/UL

## 2017-02-12 PROCEDURE — 85610 PROTHROMBIN TIME: CPT

## 2017-02-12 PROCEDURE — 82397 CHEMILUMINESCENT ASSAY: CPT

## 2017-02-12 PROCEDURE — 99233 SBSQ HOSP IP/OBS HIGH 50: CPT | Mod: ,,, | Performed by: INTERNAL MEDICINE

## 2017-02-12 PROCEDURE — P9047 ALBUMIN (HUMAN), 25%, 50ML: HCPCS | Performed by: INTERNAL MEDICINE

## 2017-02-12 PROCEDURE — 63600175 PHARM REV CODE 636 W HCPCS: Performed by: INTERNAL MEDICINE

## 2017-02-12 PROCEDURE — 25000003 PHARM REV CODE 250: Performed by: INTERNAL MEDICINE

## 2017-02-12 PROCEDURE — 63600175 PHARM REV CODE 636 W HCPCS: Performed by: STUDENT IN AN ORGANIZED HEALTH CARE EDUCATION/TRAINING PROGRAM

## 2017-02-12 PROCEDURE — 36415 COLL VENOUS BLD VENIPUNCTURE: CPT

## 2017-02-12 PROCEDURE — 25000003 PHARM REV CODE 250: Performed by: STUDENT IN AN ORGANIZED HEALTH CARE EDUCATION/TRAINING PROGRAM

## 2017-02-12 PROCEDURE — 25000003 PHARM REV CODE 250: Performed by: HOSPITALIST

## 2017-02-12 PROCEDURE — 84100 ASSAY OF PHOSPHORUS: CPT

## 2017-02-12 PROCEDURE — P9045 ALBUMIN (HUMAN), 5%, 250 ML: HCPCS | Performed by: STUDENT IN AN ORGANIZED HEALTH CARE EDUCATION/TRAINING PROGRAM

## 2017-02-12 PROCEDURE — 63600175 PHARM REV CODE 636 W HCPCS: Performed by: HOSPITALIST

## 2017-02-12 PROCEDURE — 20600001 HC STEP DOWN PRIVATE ROOM

## 2017-02-12 PROCEDURE — 85025 COMPLETE CBC W/AUTO DIFF WBC: CPT

## 2017-02-12 PROCEDURE — 86703 HIV-1/HIV-2 1 RESULT ANTBDY: CPT

## 2017-02-12 PROCEDURE — 83735 ASSAY OF MAGNESIUM: CPT

## 2017-02-12 PROCEDURE — 80053 COMPREHEN METABOLIC PANEL: CPT

## 2017-02-12 RX ORDER — LANOLIN ALCOHOL/MO/W.PET/CERES
400 CREAM (GRAM) TOPICAL DAILY
Status: DISCONTINUED | OUTPATIENT
Start: 2017-02-12 | End: 2017-02-12

## 2017-02-12 RX ORDER — MAGNESIUM SULFATE HEPTAHYDRATE 40 MG/ML
2 INJECTION, SOLUTION INTRAVENOUS ONCE
Status: COMPLETED | OUTPATIENT
Start: 2017-02-12 | End: 2017-02-12

## 2017-02-12 RX ORDER — MIDODRINE HYDROCHLORIDE 2.5 MG/1
5 TABLET ORAL
Status: DISCONTINUED | OUTPATIENT
Start: 2017-02-12 | End: 2017-02-13

## 2017-02-12 RX ORDER — ERGOCALCIFEROL 1.25 MG/1
50000 CAPSULE ORAL
Status: DISCONTINUED | OUTPATIENT
Start: 2017-02-12 | End: 2017-02-12

## 2017-02-12 RX ORDER — PANTOPRAZOLE SODIUM 40 MG/1
40 TABLET, DELAYED RELEASE ORAL ONCE
Status: COMPLETED | OUTPATIENT
Start: 2017-02-12 | End: 2017-02-12

## 2017-02-12 RX ORDER — OCTREOTIDE ACETATE 100 UG/ML
100 INJECTION, SOLUTION INTRAVENOUS; SUBCUTANEOUS EVERY 8 HOURS
Status: DISCONTINUED | OUTPATIENT
Start: 2017-02-12 | End: 2017-02-16

## 2017-02-12 RX ORDER — LANOLIN ALCOHOL/MO/W.PET/CERES
400 CREAM (GRAM) TOPICAL ONCE
Status: DISCONTINUED | OUTPATIENT
Start: 2017-02-12 | End: 2017-02-12

## 2017-02-12 RX ORDER — ALBUMIN HUMAN 250 G/1000ML
25 SOLUTION INTRAVENOUS EVERY 6 HOURS
Status: DISCONTINUED | OUTPATIENT
Start: 2017-02-12 | End: 2017-02-13

## 2017-02-12 RX ADMIN — FOLIC ACID 1 MG: 1 TABLET ORAL at 09:02

## 2017-02-12 RX ADMIN — ALBUMIN (HUMAN) 25 G: 12.5 SOLUTION INTRAVENOUS at 11:02

## 2017-02-12 RX ADMIN — MAGNESIUM SULFATE IN WATER 2 G: 40 INJECTION, SOLUTION INTRAVENOUS at 09:02

## 2017-02-12 RX ADMIN — LACTULOSE 20 G: 10 SOLUTION ORAL at 09:02

## 2017-02-12 RX ADMIN — LACTULOSE 20 G: 10 SOLUTION ORAL at 02:02

## 2017-02-12 RX ADMIN — POTASSIUM CHLORIDE 10 MEQ: 750 CAPSULE, EXTENDED RELEASE ORAL at 09:02

## 2017-02-12 RX ADMIN — PANTOPRAZOLE SODIUM 40 MG: 40 TABLET, DELAYED RELEASE ORAL at 02:02

## 2017-02-12 RX ADMIN — ALBUMIN (HUMAN) 25 G: 12.5 SOLUTION INTRAVENOUS at 06:02

## 2017-02-12 RX ADMIN — THIAMINE HYDROCHLORIDE 100 MG: 100 INJECTION, SOLUTION INTRAMUSCULAR; INTRAVENOUS at 04:02

## 2017-02-12 RX ADMIN — RIFAXIMIN 550 MG: 550 TABLET ORAL at 08:02

## 2017-02-12 RX ADMIN — LORAZEPAM 1 MG: 1 TABLET ORAL at 12:02

## 2017-02-12 RX ADMIN — RIFAXIMIN 550 MG: 550 TABLET ORAL at 09:02

## 2017-02-12 RX ADMIN — OCTREOTIDE ACETATE 100 MCG: 100 INJECTION, SOLUTION INTRAVENOUS; SUBCUTANEOUS at 09:02

## 2017-02-12 RX ADMIN — OCTREOTIDE ACETATE 100 MCG: 100 INJECTION, SOLUTION INTRAVENOUS; SUBCUTANEOUS at 06:02

## 2017-02-12 RX ADMIN — PANTOPRAZOLE SODIUM 40 MG: 40 TABLET, DELAYED RELEASE ORAL at 09:02

## 2017-02-12 RX ADMIN — CEFTRIAXONE 1 G: 1 INJECTION, SOLUTION INTRAVENOUS at 04:02

## 2017-02-12 RX ADMIN — MIDODRINE HYDROCHLORIDE 5 MG: 2.5 TABLET ORAL at 06:02

## 2017-02-12 RX ADMIN — MIDODRINE HYDROCHLORIDE 5 MG: 2.5 TABLET ORAL at 12:02

## 2017-02-12 RX ADMIN — THERA TABS 1 TABLET: TAB at 09:02

## 2017-02-12 RX ADMIN — LACTULOSE 20 G: 10 SOLUTION ORAL at 05:02

## 2017-02-12 RX ADMIN — ALBUMIN (HUMAN) 25 G: 12.5 SOLUTION INTRAVENOUS at 12:02

## 2017-02-12 RX ADMIN — POTASSIUM CHLORIDE 50 MEQ: 750 CAPSULE, EXTENDED RELEASE ORAL at 09:02

## 2017-02-12 RX ADMIN — ERGOCALCIFEROL 50000 UNITS: 1.25 CAPSULE ORAL at 09:02

## 2017-02-12 NOTE — PROGRESS NOTES
Notified MD that UO for [id number] was 100ml. Total UO for shift was 150ml. MD will pass on to day team. NO further orders received. Will continue to monitor.

## 2017-02-12 NOTE — ASSESSMENT & PLAN NOTE
- corrected Ca on admission is 12.4  - PTH is low normal --> unlikely hyperparathyroidism   - vitamin D is low -- > unlikely vitamin D toxicity or granulomatous diseases   - TSH is normal   -- will order PTHrP first if negative, will do SPEP,UPEP, serum free light chain assay

## 2017-02-12 NOTE — PROGRESS NOTES
[facility] Hospital Medicine  Progress Note    Patient Name: Ricky Parsons  MRN: [id number]  Patient Class: IP- Inpatient   Admission Date: 2/10/2017  Length of Stay: 1 days  Attending Physician: Daniela Whiteside MD  Primary Care Provider: Provider [facility] Medicine Team: [facility]OSP MED 4 Ryan Guhtrie MD    Subjective:     Principal Problem:Decompensated hepatic cirrhosis    HPI:  Mr. Parsons is a 52 yo male no reported PMH per pt, per OSH records PMH CVA and wheelchair bound who presents as a transfer from [facility] per documentation there after a fall from chair and generalized weakness. Was found to be disoriented with abdominal distension and ascites so transferred to [facility] for liver evaluation. Found to be hyponatremic with a lactic acidosis to 3.3 there but hemodynamically stable. On arrival noted to have stable vital signs and evaluated by ICU. He reported then being cold and having heartburn but currently has no complaints and is significantly drowsy. Denies taking any medications at home. Consumes about 6 beers/day but does not know last drink. Per OSH ED notes was seeing bugs on sheets.     Notable ED studies from [place], ABG 7.57/79.1/29.5. Rapid strep positive, troponin negative, EtOH negative, CPK 80. UDS negative. Attempted to contact Ms. Sher (listed in demographics as emergency contact) and number disconnected. No number for his spouse is provided in transfer documentation.        Hospital Course:  Patient started on lactulose and rifaximin for HE and on rocephin empirically, seen by nephrology and recommend midodrine, albumin and octreotide to improve his MAP and his STEPHANIE, hepatology is on board for liver eval and recommend diagnostic paracentesis but can't take consent .    Interval History: patient seen today, still alerted and sleeping, his wife is bad historian (had stroke) and mentioned he stopped alcohol and had sezuire likely due  to alcohol withdrawals      Review of Systems   Unable to perform ROS: Mental status change     Objective:     Vital Signs (Most Recent):  Temp: 98.1 °F (36.7 °C) (02/12/17 1200)  Pulse: 101 (02/12/17 1200)  Resp: 18 (02/12/17 1200)  BP: 105/63 (02/12/17 1200)  SpO2: 96 % (02/12/17 1200) Vital Signs (24h Range):  Temp:  [97.6 °F (36.4 °C)-98.2 °F (36.8 °C)] 98.1 °F (36.7 °C)  Pulse:  [101-122] 101  Resp:  [16-18] 18  SpO2:  [96 %-99 %] 96 %  BP: ()/(53-70) 105/63     Weight: 81.6 kg (180 lb)  Body mass index is 27.37 kg/(m^2).    Intake/Output Summary (Last 24 hours) at 02/12/17 1317  Last data filed at 02/12/17 0600   Gross per 24 hour   Intake              180 ml   Output              550 ml   Net             -370 ml      Physical Exam   Constitutional: He appears lethargic. He appears ill. No distress.   HENT:   Head: Normocephalic and atraumatic.   Mouth/Throat: Mucous membranes are pale.   Eyes: EOM are normal. Scleral icterus is present.   Neck: Normal range of motion. Neck supple.   Cardiovascular: Normal rate, regular rhythm and intact distal pulses.    No murmur heard.  Pulmonary/Chest: Effort normal and breath sounds normal. No respiratory distress. He has no wheezes. He has no rales.   Abdominal: Soft. Bowel sounds are normal. He exhibits distension, fluid wave and ascites. There is hepatomegaly. There is no tenderness. There is no rebound and no guarding.   Musculoskeletal: He exhibits edema (severe TIARA edema to buttocks).   Neurological: He appears lethargic. He is disoriented. GCS eye subscore is 3. GCS verbal subscore is 4. GCS motor subscore is 6.   Equivocal asterixis due to participation   Skin: Skin is warm and dry.   Diffusely jaundiced   Psychiatric: His speech is slurred. Cognition and memory are impaired. He is inattentive.       Significant Labs:     Bilirubin:   Recent Labs  Lab 02/11/17  0011 02/11/17  0744 02/12/17  0446   BILITOT 3.1* 3.0* 3.1*     Blood Culture:   Recent Labs  Lab  02/11/17  0011   LABBLOO No Growth to date  No Growth to date  No Growth to date  No Growth to date     CBC:   Recent Labs  Lab 02/11/17  0010 02/12/17  0446   WBC 10.70 13.13*   HGB 8.8* 7.8*   HCT 24.4* 21.7*   PLT 87* 68*     CMP:   Recent Labs  Lab 02/11/17  0011 02/11/17  0744 02/12/17  0446   * 130* 131*   K 3.0* 3.1* 3.9   CL 91* 93* 96   CO2 31* 31* 27    86 83   BUN 25* 24* 30*   CREATININE 2.0* 1.9* 2.3*   CALCIUM 10.4 10.3 10.4   PROT 6.7 6.4 6.5   ALBUMIN 1.6* 1.5* 2.1*   BILITOT 3.1* 3.0* 3.1*   ALKPHOS 95 90 103   AST 65* 74* 88*   ALT 28 27 30   ANIONGAP 5* 6* 8   EGFRNONAA 37.3* 39.7* 31.5*     Recent Labs  Lab 02/12/17  0446   INR 2.6*     Lactic Acid:   Recent Labs  Lab 02/11/17  0011   LACTATE 2.7*     Lipase:   Recent Labs  Lab 02/11/17  0011   LIPASE 115*     POCT Glucose:   Recent Labs  Lab 02/11/17  0452 02/12/17  0031 02/12/17  0633   POCTGLUCOSE 98 108 97     Recent Labs  Lab 02/11/17  0011   COLORU Brown*   APPEARANCEUA Hazy*   PHUR 5.0   SPECGRAV 1.020   PROTEINUA Trace*   GLUCUA Negative   KETONESU Negative   BILIRUBINUA Negative   OCCULTUA Trace*   NITRITE Negative   UROBILINOGEN Negative   LEUKOCYTESUR Negative       Significant Imaging: U/S: I have reviewed all pertinent results/findings within the past 24 hours and my personal findings are:  with micronodular cirrhosis     Assessment/Plan:      * Decompensated hepatic cirrhosis  - from limited history most likely 2/2 EtOH cirrhosis. Does not appear to have transaminitis elevation >1000k so tylenol, acute hep, shock, Budd Chiari less likely. Also consider autoimmune, morgan's, chronic viral hepatitis, hemachromatosis  -Hep panel pending  -IgG, anti smooth muscle ab pending, ceruloplasmin low  -Ferritin pending  -US Liver w/ doppler with micronodular cirrhosis   -Would like to perform paracentesis, unable to find family member or working number to obtain consent, already on rocephin for high WBC and possible SBP  -UA  clear, CxR without acute cardiopulmonary abnormality, BCx NGTD  -CTX 1g q24h for SBP ppx    MELD-Na score: 31 at 2/12/2017  4:46 AM  MELD score: 29 at 2/12/2017  4:46 AM  Calculated from:  Serum Creatinine: 2.3 mg/dL at 2/12/2017  4:46 AM  Serum Sodium: 131 mmol/L at 2/12/2017  4:46 AM  Total Bilirubin: 3.1 mg/dL at 2/12/2017  4:46 AM  INR(ratio): 2.6 at 2/12/2017  4:46 AM  Age: 51 years  -Hepatology on board      Hyponatremia  -Most likely 2/2 decompensated cirrhosis, improving  - serum osm with 280 almost hypotonic range and overloaded with Yvonne <20 --> DDx: CHF or cirrhosis   - fluid restriction and low salt diet.       Acute renal failure  -Cr. 2.0 on unknown baseline, unknown etiology possibly pre-renal vs ATN vs HRS  -Urine sodium < 20 with FENa with pre-renal picture, urine protein/creatinine ratio normal 0.11  -Retroperitoneal US normal sizes  -UA unremarkable  -his Cr worsening and started on HRS protocol, midodrine 5 mg TID, octreotide 100 mg subcut TID and albumin q6h as recommend by nephrology      Hepatic encephalopathy  -Grade II-III, ammonia elevated, likely 2/2 decompensated cirrhosis  -Lactulose TID, rifaximin  -Seizure precautions, fall precautions, aspiration precautions  -Neurochecks q4h  -Head CT was negative for acute events and utox is negative.     Hypercalcemia  - corrected Ca on admission is 12.4  - PTH is low normal --> unlikely hyperparathyroidism   - vitamin D is low -- > unlikely vitamin D toxicity or granulomatous diseases   - TSH is normal   -- will order PTHrP first if negative, will do SPEP,UPEP, serum free light chain assay       Coagulopathy  -Likely 2/2 liver dz, no evidence of bleeding so will hold Vit K      Thrombocytopenia, unspecified  -Likely 2/2 liver dz, continue to monitor, no indication for transfusion      Macrocytic anemia  -Most likely due to chronic dz  -Iron studies pending  -Vit B12, folate normal      History of rapid strep test  -Rapid strep positive at  OSH  -Will get strep Cx here  -CTX should provide good coverage if true positive      Protein-calorie malnutrition, severe  -Likely 2/2 malnutrition from EtOH and liver dz  -Nutrition consulted  - on thiamine       ETOH abuse  -Reports heavy EtOH consumption, unsure of last drink, his wife mentioned frequent seizure after d/c of alcohol   -Thiamine, folate, multivitamin  -Monitor for withdrawal symptoms      Hypokalemia, resolved as of 2/12/2017  -Replaced      VTE Risk Mitigation         Ordered     Medium Risk of VTE  Once      02/11/17 0702     Place sequential compression device  Until discontinued      02/11/17 0702     Place AMA hose  Until discontinued      02/11/17 0702          Ryan Guthrie MD  Department of Hospital Medicine   [facility]

## 2017-02-12 NOTE — ASSESSMENT & PLAN NOTE
-Cr. 2.0 on unknown baseline, unknown etiology possibly pre-renal vs ATN vs HRS  -Urine sodium < 20 with FENa with pre-renal picture, urine protein/creatinine ratio normal 0.11  -Retroperitoneal US normal sizes  -UA unremarkable  -his Cr worsening and started on HRS protocol, midodrine 5 mg TID, octreotide 100 mg subcut TID and albumin q6h as recommend by nephrology

## 2017-02-12 NOTE — PROGRESS NOTES
Spoke with Dr. Leigh who called wanting to know why her recomendations were not followed by primary team. Primary team on call doctor notified and gave number to call Dr. Leigh to discuss treatment plan. MD also informed of low urine output of 50cc for 3pm-10pm. No new orders received. Will continue to monitor.

## 2017-02-12 NOTE — PROGRESS NOTES
Progress Note  Hepatology    Admit Date: 2/10/2017   LOS: 1 day     SUBJECTIVE:     Follow-up For: Decompensated cirrhosis - transplant evaluation    HPI: Ricky Parsons is a 51 y.o. gentleman with PMH of CVA , wheelchair bound, EtOH abuse who presents to [facility] as a transfer from [facility] for hepatology evaluation. Per report pt presented to OSH after a fall from chair and generalized weakness. His wife reports that he has a history of seizures but she cannot recall last seizure. They live in [place]. The patient has [place] medicaid.     Interval history: More awake today. Wife is at bedside. No problems overnight.     Scheduled Meds:   albumin human 5%  25 g Intravenous Q8H    cefTRIAXone (ROCEPHIN) IVPB  1 g Intravenous Q24H    ergocalciferol  50,000 Units Oral Q7 Days    folic acid  1 mg Oral Daily    lactulose  20 g Oral TID    midodrine  5 mg Oral TID WM    multivitamin  1 tablet Oral Daily    octreotide  100 mcg Subcutaneous Q8H    pantoprazole  40 mg Oral Daily    potassium chloride  10 mEq Oral Daily    potassium chloride  50 mEq Oral Daily    rifAXIMimin  550 mg Oral BID    thiamine (VITAMIN B1) IVPB  100 mg Intravenous Q24H     Continuous Infusions:   PRN Meds:dextrose 50%, dextrose 50%, glucagon (human recombinant), glucose, glucose, lorazepam, ondansetron    OBJECTIVE:     Physical Exam:  Vital Signs (Most Recent)  Temp: 98.1 °F (36.7 °C) (02/12/17 1200)  Pulse: 101 (02/12/17 1200)  Resp: 18 (02/12/17 1200)  BP: 105/63 (02/12/17 1200)  SpO2: 96 % (02/12/17 1200)    Temperature Range Min/Max (Last 24H):  Temp:  [97.6 °F (36.4 °C)-98.2 °F (36.8 °C)]     General appearance: alert, appears stated age and cooperative.  Eyes: negative findings: conjunctivae and scleral icterus.   Throat: lips, mucosa, and tongue normal.  Lungs: clear to auscultation bilaterally.  Heart: S1, S2 normal.  Abdomen: soft, non-tender; bowel sounds normal; no masses, no organomegaly.  Skin: No rashes  "or lesions to exposed areas; jaundice appreciated.    Laboratory:    Recent Labs  Lab 02/11/17  0011 02/11/17  0744 02/12/17  0446   * 130* 131*   K 3.0* 3.1* 3.9   CL 91* 93* 96   CO2 31* 31* 27   BUN 25* 24* 30*   CREATININE 2.0* 1.9* 2.3*   CALCIUM 10.4 10.3 10.4   PROT 6.7 6.4 6.5   BILITOT 3.1* 3.0* 3.1*   ALKPHOS 95 90 103   ALT 28 27 30   AST 65* 74* 88*         Recent Labs  Lab 02/11/17  0010 02/12/17  0446   WBC 10.70 13.13*   HGB 8.8* 7.8*   HCT 24.4* 21.7*   PLT 87* 68*         Recent Labs  Lab 02/11/17 0011 02/11/17 0744 02/12/17  0446   INR 2.4* 2.4* 2.6*       MELD-Na score: 31 at 2/12/2017  4:46 AM  MELD score: 29 at 2/12/2017  4:46 AM  Calculated from:  Serum Creatinine: 2.3 mg/dL at 2/12/2017  4:46 AM  Serum Sodium: 131 mmol/L at 2/12/2017  4:46 AM  Total Bilirubin: 3.1 mg/dL at 2/12/2017  4:46 AM  INR(ratio): 2.6 at 2/12/2017  4:46 AM  Age: 51 years    Assessment:  Ricky Parsons is a 51 y.o. gentleman with PMH of CVA , wheelchair bound, EtOH abuse and reported seizure history who presents [place] [facility] [place] [facility] for hepatology evaluation.    1. Decompensated (suspected) alcohol cirrhosis - history is limited given encephalopathy. His liver disease is complicated by:  · Hepatic encephalopathy  · Ascites  2. Seizure disorder - not clear the history behind this. Patient's wife states he "drops" at home frequently.    Plan:  1. Patient is not a transplant candidate given insurance - [place] medicaid.   2. Recommend diagnostic paracentesis.   3. Continue lactulose and titrate to 3-4 BM's per day.   4. Agree with thiamine and folic acid.   5. Recommend cardiac and neuro work up given frequent falls.   6. Awaiting HIV.     Malgorzata You, MD PGY-5  Gastroenterology Fellow  Pager# [phone]           "

## 2017-02-12 NOTE — ASSESSMENT & PLAN NOTE
-Most likely 2/2 decompensated cirrhosis, improving  - serum osm with 280 almost hypotonic range and overloaded with Yvonne <20 --> DDx: CHF or cirrhosis   - fluid restriction and low salt diet.

## 2017-02-12 NOTE — ASSESSMENT & PLAN NOTE
-Reports heavy EtOH consumption, unsure of last drink, his wife mentioned frequent seizure after d/c of alcohol   -Thiamine, folate, multivitamin  -Monitor for withdrawal symptoms

## 2017-02-12 NOTE — CONSULTS
Consults   Consult Note  Nephrology    Consult Requested By: Daniela Whiteside MD  Reason for Consult: Worsening Renal Function, Hyponatremia    SUBJECTIVE:     History of Present Illness: taken from chart review, patient is disoriented  Patient is a 51 y.o. male presents with CVA, wheelchair bound, ETOH abuse, admitted to [facility] after a fall and disorientation, found to be encephalopathic with lactic acidosis, large volume ascites, Hyponatremia, rapid strep test +, serum creatinine 2.0, CPK 80, UPRCT 0.11, trace blood on UA. Patient initiated on lactulose and rifaximin and transferred to [facility] [place] evaluation  and treatment.    Past Medical History   Diagnosis Date    Seizure disorder      Past Surgical History   Procedure Laterality Date    Right jaw       plate placement     History reviewed. No pertinent family history.  Social History   Substance Use Topics    Smoking status: Current Every Day Smoker     Packs/day: 0.50    Smokeless tobacco: None    Alcohol use Yes       Review of patient's allergies indicates:  No Known Allergies     Review of Systems: 14 points unobtainable as patient is disoriented, all information from chart review.      OBJECTIVE:     Vital Signs (Most Recent)  Temp: 97.9 °F (36.6 °C) (02/11/17 1600)  Pulse: (!) 119 (02/11/17 2100)  Resp: 16 (02/11/17 1600)  BP: 109/69 (02/11/17 1600)  SpO2: 98 % (02/11/17 1600)    Vital Signs Range (Last 24H):  Temp:  [97.3 °F (36.3 °C)-97.9 °F (36.6 °C)]   Pulse:  []   Resp:  [13-28]   BP: ()/(52-69)   SpO2:  [97 %-100 %]     Physical Exam:  General: well developed, appears older than stated age, cachectic, icteric, no distress, l  HENT: Head:normocephalic, atraumatic. Ears:bilateral TM's and external ear canals normal. Nose: Nares normal. Septum midline. Mucosa normal. No drainage or sinus tenderness., no discharge. Throat: lips, mucosa, and tongue normal; teeth and gums normal and no throat erythema.  Eyes:  conjunctivae/corneas clear. PERRL.   Neck: no adenopathy, no carotid bruit, no JVD, supple, symmetrical, trachea midline, no JVD and thyroid not enlarged, symmetric, no tenderness/mass/nodules  Lungs:  clear to auscultation bilaterally, diminished breath sounds bilaterally and no wheeze, rales or rhonchi bilaterlly  Cardiovascular: Heart: regular rate and rhythm, S1, S2 normal, no murmur, click, rub or gallop. Chest Wall: no tenderness. Extremities: edema bialteral + 4 and sacral edema, no cyanosis and unable to palpate DP with edema. Pulses: 2+ and symmetric  not well palpated.  Abdomen/Rectal: Abdomen: distended, tense nontender, uanble to palpate liver, spleen, decreased bowel sounds. Rectal: normal tone, no masses or tenderness and not examined  Skin: no evidence of bleeding or bruising, no lesions noted, temperature normal, texture normal and spider angioma or k  Musculoskeletal:no clubbing, cyanosis and unable to test movement or strength due to disorientation  Psych/Behavioral:  confused, disoriented to person, palce and time, mood stable, affect flat    Laboratory:  CBC:   Recent Labs  Lab 02/11/17  0010   WBC 10.70   RBC 2.30*   HGB 8.8*   HCT 24.4*   PLT 87*   *   MCH 38.3*   MCHC 36.1*     CMP:   Recent Labs  Lab 02/11/17  0744   GLU 86   CALCIUM 10.3   ALBUMIN 1.5*   PROT 6.4   *   K 3.1*   CO2 31*   CL 93*   BUN 24*   CREATININE 1.9*   ALKPHOS 90   ALT 27   AST 74*   BILITOT 3.0*     LFTs:   Recent Labs  Lab 02/11/17  0744   ALT 27   AST 74*   ALKPHOS 90   BILITOT 3.0*   PROT 6.4   ALBUMIN 1.5*       Recent Labs  Lab 02/11/17  0011   COLORU Brown*   SPECGRAV 1.020   PHUR 5.0   PROTEINUA Trace*   NITRITE Negative   LEUKOCYTESUR Negative   UROBILINOGEN Negative       Diagnostic Results:  Labs Reviewed  Renal US reviewed    ASSESSMENT/PLAN:       Active Hospital Problems    Diagnosis  POA    *Decompensated hepatic cirrhosis [K72.90]  Yes    Hyponatremia [E87.1]  Yes    Acute renal failure  [N17.9]  Yes    Hepatic encephalopathy [K72.90]  Yes    Hypercalcemia [E83.52]  Yes    Coagulopathy [D68.9]  Yes    Thrombocytopenia, unspecified [D69.6]  Yes    Hypokalemia [E87.6]  Yes    Macrocytic anemia [D53.9]  Yes    History of rapid strep test [Z92.89]  Not Applicable    Protein-calorie malnutrition, severe [E43]  Yes    ETOH abuse [F10.10]  Yes    Vitamin D insufficiency [E55.9]  Yes      Resolved Hospital Problems    Diagnosis Date Resolved POA   No resolved problems to display.     50 yo wm with h/o ETOH abuse, CVA, found to be encephalopathic,icteric hyponatremic with tense ascites and serum creatinine of 2.0, unknown baseline, minimal proteinuria, lactic acid 3.3, normal imaging of kidneys.    STEPHANIE on unknown baseline and unknown urine output   HRS/sepsis/atn/prerenal etiologies  Would begin midodrine and albumin and consider octreotide if worsening renal function or no improvement in 24 hours     Check Uosm and Berna    Hyponatremia  PO fluid restrict 9039-9290 cc daily    Hypokalemia: IV replacement if unable to tolerate oral  Check magnesium    Lactic acidosis 2.7 improving, with serum CO2 31, suggest vbg          Mar Leigh MD  Nephrology

## 2017-02-12 NOTE — PLAN OF CARE
Problem: Patient Care Overview  Goal: Plan of Care Review  Outcome: Ongoing (interventions implemented as appropriate)  No significant events this shift. Patient free from falls and injury. VSS. Patient complained of heartburn throughout the night. Protonics ordered but provided little relief. Patient UO low. Albumin ordered q8 hrs to bring up kidney function. Patient had 1 bowel movement. Some electrolytes replaced and IV antibiotic therapy maintained.  Will continue to monitor.

## 2017-02-12 NOTE — SUBJECTIVE & OBJECTIVE
Interval History: patient seen today, still alerted and sleeping, his wife is bad historian (had stroke) and mentioned he stopped alcohol and had sezuire likely due to alcohol withdrawals      Review of Systems   Unable to perform ROS: Mental status change     Objective:     Vital Signs (Most Recent):  Temp: 98.1 °F (36.7 °C) (02/12/17 1200)  Pulse: 101 (02/12/17 1200)  Resp: 18 (02/12/17 1200)  BP: 105/63 (02/12/17 1200)  SpO2: 96 % (02/12/17 1200) Vital Signs (24h Range):  Temp:  [97.6 °F (36.4 °C)-98.2 °F (36.8 °C)] 98.1 °F (36.7 °C)  Pulse:  [101-122] 101  Resp:  [16-18] 18  SpO2:  [96 %-99 %] 96 %  BP: ()/(53-70) 105/63     Weight: 81.6 kg (180 lb)  Body mass index is 27.37 kg/(m^2).    Intake/Output Summary (Last 24 hours) at 02/12/17 1317  Last data filed at 02/12/17 0600   Gross per 24 hour   Intake              180 ml   Output              550 ml   Net             -370 ml      Physical Exam   Constitutional: He appears lethargic. He appears ill. No distress.   HENT:   Head: Normocephalic and atraumatic.   Mouth/Throat: Mucous membranes are pale.   Eyes: EOM are normal. Scleral icterus is present.   Neck: Normal range of motion. Neck supple.   Cardiovascular: Normal rate, regular rhythm and intact distal pulses.    No murmur heard.  Pulmonary/Chest: Effort normal and breath sounds normal. No respiratory distress. He has no wheezes. He has no rales.   Abdominal: Soft. Bowel sounds are normal. He exhibits distension, fluid wave and ascites. There is hepatomegaly. There is no tenderness. There is no rebound and no guarding.   Musculoskeletal: He exhibits edema (severe TIARA edema to buttocks).   Neurological: He appears lethargic. He is disoriented. GCS eye subscore is 3. GCS verbal subscore is 4. GCS motor subscore is 6.   Equivocal asterixis due to participation   Skin: Skin is warm and dry.   Diffusely jaundiced   Psychiatric: His speech is slurred. Cognition and memory are impaired. He is inattentive.        Significant Labs:     Bilirubin:   Recent Labs  Lab 02/11/17  0011 02/11/17  0744 02/12/17  0446   BILITOT 3.1* 3.0* 3.1*     Blood Culture:   Recent Labs  Lab 02/11/17  0011   LABBLOO No Growth to date  No Growth to date  No Growth to date  No Growth to date     CBC:   Recent Labs  Lab 02/11/17  0010 02/12/17  0446   WBC 10.70 13.13*   HGB 8.8* 7.8*   HCT 24.4* 21.7*   PLT 87* 68*     CMP:   Recent Labs  Lab 02/11/17  0011 02/11/17  0744 02/12/17  0446   * 130* 131*   K 3.0* 3.1* 3.9   CL 91* 93* 96   CO2 31* 31* 27    86 83   BUN 25* 24* 30*   CREATININE 2.0* 1.9* 2.3*   CALCIUM 10.4 10.3 10.4   PROT 6.7 6.4 6.5   ALBUMIN 1.6* 1.5* 2.1*   BILITOT 3.1* 3.0* 3.1*   ALKPHOS 95 90 103   AST 65* 74* 88*   ALT 28 27 30   ANIONGAP 5* 6* 8   EGFRNONAA 37.3* 39.7* 31.5*     Recent Labs  Lab 02/12/17  0446   INR 2.6*     Lactic Acid:   Recent Labs  Lab 02/11/17  0011   LACTATE 2.7*     Lipase:   Recent Labs  Lab 02/11/17  0011   LIPASE 115*     POCT Glucose:   Recent Labs  Lab 02/11/17  0452 02/12/17  0031 02/12/17  0633   POCTGLUCOSE 98 108 97     Recent Labs  Lab 02/11/17  0011   COLORU Brown*   APPEARANCEUA Hazy*   PHUR 5.0   SPECGRAV 1.020   PROTEINUA Trace*   GLUCUA Negative   KETONESU Negative   BILIRUBINUA Negative   OCCULTUA Trace*   NITRITE Negative   UROBILINOGEN Negative   LEUKOCYTESUR Negative       Significant Imaging: U/S: I have reviewed all pertinent results/findings within the past 24 hours and my personal findings are:  with micronodular cirrhosis

## 2017-02-12 NOTE — ASSESSMENT & PLAN NOTE
-Grade II-III, ammonia elevated, likely 2/2 decompensated cirrhosis  -Lactulose TID, rifaximin  -Seizure precautions, fall precautions, aspiration precautions  -Neurochecks q4h  -Head CT was negative for acute events and utox is negative.

## 2017-02-12 NOTE — ASSESSMENT & PLAN NOTE
- from limited history most likely 2/2 EtOH cirrhosis. Does not appear to have transaminitis elevation >1000k so tylenol, acute hep, shock, Budd Chiari less likely. Also consider autoimmune, morgan's, chronic viral hepatitis, hemachromatosis  -Hep panel pending  -IgG, anti smooth muscle ab pending, ceruloplasmin low  -Ferritin pending  -US Liver w/ doppler with micronodular cirrhosis   -Would like to perform paracentesis, unable to find family member or working number to obtain consent, already on rocephin for high WBC and possible SBP  -UA clear, CxR without acute cardiopulmonary abnormality, BCx NGTD  -CTX 1g q24h for SBP ppx    MELD-Na score: 31 at 2/12/2017  4:46 AM  MELD score: 29 at 2/12/2017  4:46 AM  Calculated from:  Serum Creatinine: 2.3 mg/dL at 2/12/2017  4:46 AM  Serum Sodium: 131 mmol/L at 2/12/2017  4:46 AM  Total Bilirubin: 3.1 mg/dL at 2/12/2017  4:46 AM  INR(ratio): 2.6 at 2/12/2017  4:46 AM  Age: 51 years  -Hepatology on board

## 2017-02-13 PROBLEM — R76.8 HEPATITIS C ANTIBODY TEST POSITIVE: Status: ACTIVE | Noted: 2017-02-13

## 2017-02-13 LAB
ABO + RH BLD: NORMAL
ALBUMIN SERPL BCP-MCNC: 3.1 G/DL
ALP SERPL-CCNC: 62 U/L
ALT SERPL W/O P-5'-P-CCNC: 20 U/L
ANION GAP SERPL CALC-SCNC: 9 MMOL/L
AST SERPL-CCNC: 56 U/L
BACTERIA THROAT CULT: NORMAL
BASOPHILS # BLD AUTO: 0.01 K/UL
BASOPHILS NFR BLD: 0.2 %
BILIRUB SERPL-MCNC: 2.5 MG/DL
BLD GP AB SCN CELLS X3 SERPL QL: NORMAL
BLD PROD TYP BPU: NORMAL
BLOOD UNIT EXPIRATION DATE: NORMAL
BLOOD UNIT TYPE CODE: 7300
BLOOD UNIT TYPE: NORMAL
BUN SERPL-MCNC: 30 MG/DL
CALCIUM SERPL-MCNC: 10.3 MG/DL
CHLORIDE SERPL-SCNC: 98 MMOL/L
CO2 SERPL-SCNC: 27 MMOL/L
CODING SYSTEM: NORMAL
CREAT SERPL-MCNC: 2.1 MG/DL
DIFFERENTIAL METHOD: ABNORMAL
DISPENSE STATUS: NORMAL
EOSINOPHIL # BLD AUTO: 0.1 K/UL
EOSINOPHIL NFR BLD: 2.3 %
ERYTHROCYTE [DISTWIDTH] IN BLOOD BY AUTOMATED COUNT: 21.4 %
EST. GFR  (AFRICAN AMERICAN): 40.6 ML/MIN/1.73 M^2
EST. GFR  (NON AFRICAN AMERICAN): 35.1 ML/MIN/1.73 M^2
GLUCOSE SERPL-MCNC: 75 MG/DL
HAV IGM SERPL QL IA: NEGATIVE
HBV CORE IGM SERPL QL IA: NEGATIVE
HBV SURFACE AG SERPL QL IA: NEGATIVE
HBV SURFACE AG SERPL QL IA: NEGATIVE
HCT VFR BLD AUTO: 19.7 %
HCV AB SERPL QL IA: POSITIVE
HCV AB SERPL QL IA: POSITIVE
HGB BLD-MCNC: 6.8 G/DL
HIV 1+2 AB+HIV1 P24 AG SERPL QL IA: NEGATIVE
IGG1 SER-MCNC: ABNORMAL MG/DL
IGG2 SER-MCNC: 714 MG/DL
IGG3 SER-MCNC: 114 MG/DL
IGG4 SER-MCNC: 339 MG/DL
INR PPP: 2.7
IRON SERPL-MCNC: 15 UG/DL
LYMPHOCYTES # BLD AUTO: 2.4 K/UL
LYMPHOCYTES NFR BLD: 39.9 %
MAGNESIUM SERPL-MCNC: 1.6 MG/DL
MCH RBC QN AUTO: 37.4 PG
MCHC RBC AUTO-ENTMCNC: 34.5 %
MCV RBC AUTO: 108 FL
MONOCYTES # BLD AUTO: 0.6 K/UL
MONOCYTES NFR BLD: 9.5 %
NEUTROPHILS # BLD AUTO: 2.8 K/UL
NEUTROPHILS NFR BLD: 47.4 %
PHOSPHATE SERPL-MCNC: 2.5 MG/DL
PLATELET # BLD AUTO: 52 K/UL
PMV BLD AUTO: 10 FL
POCT GLUCOSE: 120 MG/DL (ref 70–110)
POCT GLUCOSE: 122 MG/DL (ref 70–110)
POCT GLUCOSE: 72 MG/DL (ref 70–110)
POTASSIUM SERPL-SCNC: 3.7 MMOL/L
PROT SERPL-MCNC: 6.2 G/DL
PROTHROMBIN TIME: 26.9 SEC
RBC # BLD AUTO: 1.82 M/UL
SATURATED IRON: 13 %
SMOOTH MUSCLE AB TITR SER IF: NORMAL {TITER}
SODIUM SERPL-SCNC: 134 MMOL/L
TOTAL IRON BINDING CAPACITY: 118 UG/DL
TRANS ERYTHROCYTES VOL PATIENT: NORMAL ML
TRANSFERRIN SERPL-MCNC: 80 MG/DL
WBC # BLD AUTO: 5.97 K/UL

## 2017-02-13 PROCEDURE — 86850 RBC ANTIBODY SCREEN: CPT

## 2017-02-13 PROCEDURE — 99232 SBSQ HOSP IP/OBS MODERATE 35: CPT | Mod: ,,, | Performed by: INTERNAL MEDICINE

## 2017-02-13 PROCEDURE — 80053 COMPREHEN METABOLIC PANEL: CPT

## 2017-02-13 PROCEDURE — 63600175 PHARM REV CODE 636 W HCPCS: Performed by: INTERNAL MEDICINE

## 2017-02-13 PROCEDURE — P9021 RED BLOOD CELLS UNIT: HCPCS

## 2017-02-13 PROCEDURE — 25000003 PHARM REV CODE 250: Performed by: HOSPITALIST

## 2017-02-13 PROCEDURE — 25000003 PHARM REV CODE 250: Performed by: STUDENT IN AN ORGANIZED HEALTH CARE EDUCATION/TRAINING PROGRAM

## 2017-02-13 PROCEDURE — 87522 HEPATITIS C REVRS TRNSCRPJ: CPT

## 2017-02-13 PROCEDURE — 84165 PROTEIN E-PHORESIS SERUM: CPT | Mod: 26,,, | Performed by: PATHOLOGY

## 2017-02-13 PROCEDURE — 85025 COMPLETE CBC W/AUTO DIFF WBC: CPT

## 2017-02-13 PROCEDURE — 99233 SBSQ HOSP IP/OBS HIGH 50: CPT | Mod: ,,, | Performed by: INTERNAL MEDICINE

## 2017-02-13 PROCEDURE — 83540 ASSAY OF IRON: CPT

## 2017-02-13 PROCEDURE — 20600001 HC STEP DOWN PRIVATE ROOM

## 2017-02-13 PROCEDURE — 83735 ASSAY OF MAGNESIUM: CPT

## 2017-02-13 PROCEDURE — P9047 ALBUMIN (HUMAN), 25%, 50ML: HCPCS | Performed by: INTERNAL MEDICINE

## 2017-02-13 PROCEDURE — 84100 ASSAY OF PHOSPHORUS: CPT

## 2017-02-13 PROCEDURE — 86920 COMPATIBILITY TEST SPIN: CPT

## 2017-02-13 PROCEDURE — 36415 COLL VENOUS BLD VENIPUNCTURE: CPT

## 2017-02-13 PROCEDURE — 84165 PROTEIN E-PHORESIS SERUM: CPT

## 2017-02-13 PROCEDURE — 63600175 PHARM REV CODE 636 W HCPCS: Performed by: STUDENT IN AN ORGANIZED HEALTH CARE EDUCATION/TRAINING PROGRAM

## 2017-02-13 PROCEDURE — 86900 BLOOD TYPING SEROLOGIC ABO: CPT

## 2017-02-13 PROCEDURE — 85610 PROTHROMBIN TIME: CPT

## 2017-02-13 PROCEDURE — 63600175 PHARM REV CODE 636 W HCPCS: Performed by: HOSPITALIST

## 2017-02-13 PROCEDURE — 25000003 PHARM REV CODE 250: Performed by: INTERNAL MEDICINE

## 2017-02-13 RX ORDER — ALBUMIN HUMAN 250 G/1000ML
25 SOLUTION INTRAVENOUS DAILY
Status: DISCONTINUED | OUTPATIENT
Start: 2017-02-14 | End: 2017-02-16

## 2017-02-13 RX ORDER — MIDODRINE HYDROCHLORIDE 2.5 MG/1
10 TABLET ORAL
Status: DISCONTINUED | OUTPATIENT
Start: 2017-02-13 | End: 2017-02-13

## 2017-02-13 RX ORDER — PHYTONADIONE 5 MG/1
10 TABLET ORAL DAILY
Status: COMPLETED | OUTPATIENT
Start: 2017-02-13 | End: 2017-02-15

## 2017-02-13 RX ORDER — MIDODRINE HYDROCHLORIDE 5 MG/1
10 TABLET ORAL
Status: DISCONTINUED | OUTPATIENT
Start: 2017-02-13 | End: 2017-02-16

## 2017-02-13 RX ORDER — HYDROCODONE BITARTRATE AND ACETAMINOPHEN 500; 5 MG/1; MG/1
TABLET ORAL
Status: DISCONTINUED | OUTPATIENT
Start: 2017-02-13 | End: 2017-02-15

## 2017-02-13 RX ORDER — MIDODRINE HYDROCHLORIDE 2.5 MG/1
10 TABLET ORAL ONCE
Status: COMPLETED | OUTPATIENT
Start: 2017-02-13 | End: 2017-02-13

## 2017-02-13 RX ADMIN — RIFAXIMIN 550 MG: 550 TABLET ORAL at 10:02

## 2017-02-13 RX ADMIN — CEFTRIAXONE 1 G: 1 INJECTION, SOLUTION INTRAVENOUS at 03:02

## 2017-02-13 RX ADMIN — OCTREOTIDE ACETATE 100 MCG: 100 INJECTION, SOLUTION INTRAVENOUS; SUBCUTANEOUS at 10:02

## 2017-02-13 RX ADMIN — ALBUMIN (HUMAN) 25 G: 12.5 SOLUTION INTRAVENOUS at 05:02

## 2017-02-13 RX ADMIN — MIDODRINE HYDROCHLORIDE 10 MG: 2.5 TABLET ORAL at 09:02

## 2017-02-13 RX ADMIN — MIDODRINE HYDROCHLORIDE 10 MG: 2.5 TABLET ORAL at 04:02

## 2017-02-13 RX ADMIN — FOLIC ACID 1 MG: 1 TABLET ORAL at 09:02

## 2017-02-13 RX ADMIN — OCTREOTIDE ACETATE 100 MCG: 100 INJECTION, SOLUTION INTRAVENOUS; SUBCUTANEOUS at 05:02

## 2017-02-13 RX ADMIN — POTASSIUM CHLORIDE 10 MEQ: 750 CAPSULE, EXTENDED RELEASE ORAL at 09:02

## 2017-02-13 RX ADMIN — RIFAXIMIN 550 MG: 550 TABLET ORAL at 09:02

## 2017-02-13 RX ADMIN — OCTREOTIDE ACETATE 100 MCG: 100 INJECTION, SOLUTION INTRAVENOUS; SUBCUTANEOUS at 01:02

## 2017-02-13 RX ADMIN — PANTOPRAZOLE SODIUM 40 MG: 40 TABLET, DELAYED RELEASE ORAL at 09:02

## 2017-02-13 RX ADMIN — POTASSIUM CHLORIDE 50 MEQ: 750 CAPSULE, EXTENDED RELEASE ORAL at 09:02

## 2017-02-13 RX ADMIN — LACTULOSE 20 G: 10 SOLUTION ORAL at 10:02

## 2017-02-13 RX ADMIN — THIAMINE HYDROCHLORIDE 100 MG: 100 INJECTION, SOLUTION INTRAMUSCULAR; INTRAVENOUS at 04:02

## 2017-02-13 RX ADMIN — LACTULOSE 20 G: 10 SOLUTION ORAL at 02:02

## 2017-02-13 RX ADMIN — THERA TABS 1 TABLET: TAB at 09:02

## 2017-02-13 RX ADMIN — LACTULOSE 20 G: 10 SOLUTION ORAL at 05:02

## 2017-02-13 RX ADMIN — PHYTONADIONE 10 MG: 5 TABLET ORAL at 04:02

## 2017-02-13 NOTE — PROGRESS NOTES
Progress Note  Nephrology    Admit Date: 2/10/2017   LOS: 2 days     SUBJECTIVE:     Follow-up For: STEPHANIE, hyponatremia    Interval follow up: NAEON. Patient more alert this morning. Refers that feels his belly tight. Creatinine remains stable. Urine output 600 ml.     OBJECTIVE:     Vital Signs (Most Recent)  Temp: 97.7 °F (36.5 °C) (02/13/17 1530)  Pulse: 110 (02/13/17 1600)  Resp: 18 (02/13/17 1530)  BP: (!) 92/57 (02/13/17 1530)  SpO2: (!) 91 % (02/13/17 1530)    Vital Signs Range (Last 24H):  Temp:  [97.2 °F (36.2 °C)-98.2 °F (36.8 °C)]   Pulse:  []   Resp:  [16-18]   BP: ()/(52-73)   SpO2:  [91 %-100 %]       Date 02/13/17 0700 - 02/14/17 0659   Shift 1594-4205 8630-5851 4046-8734 24 Hour Total   I  N  T  A  K  E   P.O. 340   340    Shift Total  (mL/kg) 340  (4.2)   340  (4.2)   O  U  T  P  U  T   Urine  (mL/kg/hr) 200  (0.3) 120  320    Shift Total  (mL/kg) 200  (2.4) 120  (1.5)  320  (3.9)   Weight (kg) 81.6 81.6 81.6 81.6     Physical Exam:   General: well developed, appears older than stated age, cachectic, icteric, no distress, l  HENT: Head:normocephalic, atraumatic. Ears:bilateral TM's and external ear canals normal. Nose: Nares normal. Septum midline. Mucosa normal. No drainage or sinus tenderness., no discharge. Throat: lips, mucosa, and tongue normal; teeth and gums normal and no throat erythema.  Eyes: conjunctivae/corneas clear. PERRL.   Neck: no adenopathy, no carotid bruit, supple, symmetrical, trachea midline, no JVD and thyroid not enlarged, symmetric, no tenderness/mass/nodules  Lungs: clear to auscultation bilaterally, diminished breath sounds bilaterally and no wheeze, rales or rhonchi bilaterlly  Cardiovascular: Heart: regular rate and rhythm, S1, S2 normal, no murmur, click, rub or gallop. Chest Wall: no tenderness. Extremities: edema bialteral + 4 and sacral edema, no cyanosis and unable to palpate DP with edema. Pulses: 2+ and symmetric  not well palpated.  Abdomen/Rectal:  Abdomen: distended, tense nontender, uanble to palpate liver, spleen, decreased bowel sounds. Rectal: normal tone, no masses or tenderness and not examined  Skin: no evidence of bleeding or bruising, no lesions noted, temperature normal, texture normal and spider angioma or k  Musculoskeletal:no clubbing, cyanosis and unable to test movement or strength due to disorientation    Laboratory:  CBC:   Recent Labs  Lab 02/13/17  0510   WBC 5.97   RBC 1.82*   HGB 6.8*   HCT 19.7*   PLT 52*   *   MCH 37.4*   MCHC 34.5     CMP:   Recent Labs  Lab 02/13/17  0510   GLU 75   CALCIUM 10.3   ALBUMIN 3.1*   PROT 6.2   *   K 3.7   CO2 27   CL 98   BUN 30*   CREATININE 2.1*   ALKPHOS 62   ALT 20   AST 56*   BILITOT 2.5*       Diagnostic Results:  Labs: Reviewed  X-Ray: Reviewed    ASSESSMENT/PLAN:     Active Hospital Problems    Diagnosis  POA    *Decompensated hepatic cirrhosis [K72.90]  Yes    Hepatitis C antibody test positive [R76.8]  Yes    Moderate protein-calorie malnutrition [E44.0]  Yes    Hyponatremia [E87.1]  Yes    Acute renal failure [N17.9]  Yes    Hepatic encephalopathy [K72.90]  Yes    Hypercalcemia [E83.52]  Yes    Coagulopathy [D68.9]  Yes    Thrombocytopenia, unspecified [D69.6]  Yes    Macrocytic anemia [D53.9]  Yes    History of rapid strep test [Z92.89]  Not Applicable    Protein-calorie malnutrition, severe [E43]  Yes    ETOH abuse [F10.10]  Yes    Vitamin D insufficiency [E55.9]  Yes      Resolved Hospital Problems    Diagnosis Date Resolved POA    Hypokalemia [E87.6] 02/12/2017 Yes     52 yo wm with h/o ETOH abuse, CVA, found to be encephalopathic,icteric hyponatremic with tense ascites and serum creatinine of 2.0, unknown baseline, minimal proteinuria, lactic acid 3.3, normal imaging of kidneys.     STEPHANIE on unknown baseline and unknown urine output   HRS/sepsis/atn/prerenal etiologies  - Patient creatinine seems to be stabilizing   - Continue Midodrine, octeotride and albumin   -  Consider paracentesis for patient ascites     Hyponatremia   - Currently improving   - Will recommend decrease fluid restriction to 1,500 ml daily     Anemia of Chronic Disease   - Blood transfusion as per primary team     Mineral Bone Disease in CKD   - Start patient on ergocalciferol 50,000 units weekly x 8 weeks     Nito Ordoñez   Nephrology fellow PGY- 5  [phone]

## 2017-02-13 NOTE — ASSESSMENT & PLAN NOTE
-Grade II-III, ammonia elevated, likely 2/2 decompensated cirrhosis  - still alerted with +astrexis  -Lactulose TID, rifaximin  -Seizure precautions, fall precautions, aspiration precautions  -Neurochecks q4h  -Head CT was negative for acute events and utox is negative.

## 2017-02-13 NOTE — ASSESSMENT & PLAN NOTE
- corrected Ca on admission is 12.4  - PTH is low normal --> unlikely hyperparathyroidism   - vitamin D is low -- > unlikely vitamin D toxicity or granulomatous diseases   - TSH is normal   --  PTHrP, SPEP,UPEP, in process

## 2017-02-13 NOTE — PLAN OF CARE
Problem: Fall Risk (Adult)  Goal: Absence of Falls  Patient will demonstrate the desired outcomes by discharge/transition of care.   Outcome: Outcome(s) achieved Date Met:  02/13/17  Patient free of falls and injuries this shift.    Problem: Patient Care Overview  Goal: Plan of Care Review  Outcome: Ongoing (interventions implemented as appropriate)  Plan of care reviewed with and spouse. Patient had no acute events this shift. VSS. Blood glucose monitored q 6. Patient receiving IV antibiotics. Patient denied all symptoms of withdrawal. Monitoring continued,    Problem: Pressure Ulcer Risk (Chaparro Scale) (Adult,Obstetrics,Pediatric)  Goal: Skin Integrity  Patient will demonstrate the desired outcomes by discharge/transition of care.   Skin intact. Patient repositioned with assist    Problem: Infection, Risk/Actual (Adult)  Goal: Infection Prevention/Resolution  Patient will demonstrate the desired outcomes by discharge/transition of care.   Patient receiving IV antibiotics

## 2017-02-13 NOTE — PROGRESS NOTES
[facility] Hospital Medicine  Progress Note    Patient Name: Ricky Parsons  MRN: [id number]  Patient Class: IP- Inpatient   Admission Date: 2/10/2017  Length of Stay: 2 days  Attending Physician: Daniela Whiteside MD  Primary Care Provider: Provider [facility] Medicine Team: [facility] 4 Ryan Guthrie MD    Subjective:     Principal Problem:Decompensated hepatic cirrhosis    HPI:  Mr. Parsons is a 50 yo male no reported PMH per pt, per OSH records PMH CVA and wheelchair bound who presents as a transfer from [facility] per documentation there after a fall from chair and generalized weakness. Was found to be disoriented with abdominal distension and ascites so [place] [facility] [place]. Found to be hyponatremic with a lactic acidosis to 3.3 there but hemodynamically stable. On arrival noted to have stable vital signs and evaluated by ICU. He reported then being cold and having heartburn but currently has no complaints and is significantly drowsy. Denies taking any medications at home. Consumes about 6 beers/day but does not know last drink. Per OSH ED notes was seeing bugs on sheets.     Notable ED studies from [place], ABG 7.57/79.1/29.5. Rapid strep positive, troponin negative, EtOH negative, CPK 80. UDS negative. Attempted to contact Ms. Sher (listed in demographics as emergency contact) and number disconnected. No number for his spouse is provided in transfer documentation.        Hospital Course:  Patient started on lactulose and rifaximin for HE and on rocephin empirically, seen by nephrology and recommend midodrine, albumin and octreotide to improve his MAP and his STEPHANIE, hepatology is on board for liver eval and recommend diagnostic paracentesis but can't take consent .    Interval History: patient seen today, still alerted, but not in pain or discomfort, not agitated, passed 3BM yesterday no abdominal pain, no fever. Still oliguric  with  cc    Review of Systems   Constitutional: Negative for chills and fever.   Respiratory: Negative for cough and shortness of breath.    Cardiovascular: Positive for leg swelling. Negative for chest pain and palpitations.   Gastrointestinal: Positive for abdominal distention. Negative for abdominal pain, constipation, nausea and vomiting.   Genitourinary: Positive for decreased urine volume. Negative for dysuria.   Musculoskeletal: Negative for neck pain.   Psychiatric/Behavioral: Positive for confusion. Negative for agitation.     Objective:     Vital Signs (Most Recent):  Temp: 97.6 °F (36.4 °C) (02/13/17 1100)  Pulse: 106 (02/13/17 1300)  Resp: 18 (02/13/17 1100)  BP: 138/73 (02/13/17 1100)  SpO2: 100 % (02/13/17 1100) Vital Signs (24h Range):  Temp:  [97.2 °F (36.2 °C)-98.2 °F (36.8 °C)] 97.6 °F (36.4 °C)  Pulse:  [] 106  Resp:  [16-18] 18  SpO2:  [94 %-100 %] 100 %  BP: ()/(52-73) 138/73     Weight: 81.6 kg (180 lb)  Body mass index is 27.37 kg/(m^2).    Intake/Output Summary (Last 24 hours) at 02/13/17 1435  Last data filed at 02/13/17 1300   Gross per 24 hour   Intake              680 ml   Output              800 ml   Net             -120 ml      Physical Exam   Constitutional: No distress.   Cachectic    HENT:   Head: Normocephalic and atraumatic.   Eyes: Scleral icterus is present.   Pale conjunctivae    Neck: No JVD present.   Cardiovascular: Normal rate, regular rhythm and normal heart sounds.    No murmur heard.  Pulmonary/Chest: Effort normal and breath sounds normal. No respiratory distress. He has no wheezes. He has no rales.   Abdominal: Bowel sounds are normal. He exhibits distension. There is no tenderness.   Tense and distended   Musculoskeletal: He exhibits edema (+3 edema till thigh).   Neurological:   Disoriented x3  + astrexis    Skin: He is not diaphoretic.   + spider angioma        Significant Labs:     Bilirubin:   Recent Labs  Lab 02/11/17  0011 02/11/17  0744  02/12/17  0446 02/13/17  0510   BILITOT 3.1* 3.0* 3.1* 2.5*     BMP:   Recent Labs  Lab 02/13/17  0510   GLU 75   *   K 3.7   CL 98   CO2 27   BUN 30*   CREATININE 2.1*   CALCIUM 10.3   MG 1.6     CBC:   Recent Labs  Lab 02/12/17  0446 02/13/17  0510   WBC 13.13* 5.97   HGB 7.8* 6.8*   HCT 21.7* 19.7*   PLT 68* 52*     CMP:   Recent Labs  Lab 02/12/17  0446 02/13/17  0510   * 134*   K 3.9 3.7   CL 96 98   CO2 27 27   GLU 83 75   BUN 30* 30*   CREATININE 2.3* 2.1*   CALCIUM 10.4 10.3   PROT 6.5 6.2   ALBUMIN 2.1* 3.1*   BILITOT 3.1* 2.5*   ALKPHOS 103 62   AST 88* 56*   ALT 30 20   ANIONGAP 8 9   EGFRNONAA 31.5* 35.1*     Coagulation:   Recent Labs  Lab 02/13/17  0510   INR 2.7*     POCT Glucose:   Recent Labs  Lab 02/12/17  2330 02/13/17  0532 02/13/17  1250   POCTGLUCOSE 91 72 122*         Assessment/Plan:      * Decompensated hepatic cirrhosis  - from limited history most likely 2/2 EtOH cirrhosis. Does not appear to have transaminitis elevation >1000k so tylenol, acute hep, shock, Budd Chiari less likely. Also consider autoimmune, morgan's, chronic viral hepatitis, hemachromatosis  -Hep panel with hep C Ab positive. Negative hep B surface Ag  Hepatitis B Surface Ag Negative   Hep B C IgM Negative   Hep A IgM Negative   Hepatitis C Ab Positive (A)     HIV ab1/2 negative.     -IgG elevated, anti smooth muscle ab negative, ceruloplasmin low  -Ferritin pending  -US Liver w/ doppler with micronodular cirrhosis   -Would like to perform paracentesis, contacted step-daughter for consent, already on rocephin for high WBC and possible SBP  -UA clear, CxR without acute cardiopulmonary abnormality, BCx NGTD  -CTX 1g q24h for SBP ppx    MELD-Na score: 29 at 2/13/2017  5:10 AM  MELD score: 28 at 2/13/2017  5:10 AM  Calculated from:  Serum Creatinine: 2.1 mg/dL at 2/13/2017  5:10 AM  Serum Sodium: 134 mmol/L at 2/13/2017  5:10 AM  Total Bilirubin: 2.5 mg/dL at 2/13/2017  5:10 AM  INR(ratio): 2.7 at 2/13/2017  5:10  AM  Age: 51 years  -Hepatology on board and recommend giving albumin daily instead of q6h, not candidate for liver transplant evaluation.       Hyponatremia  -Most likely 2/2 decompensated cirrhosis, improving  - serum osm with 280 almost hypotonic range and overloaded with Yvonne <20 --> DDx: CHF or cirrhosis   - fluid restriction and low salt diet.       Acute renal failure  -Cr. 2.0 on unknown baseline, unknown etiology possibly pre-renal vs ATN vs HRS  -Urine sodium < 20 with FENa with pre-renal picture, urine protein/creatinine ratio normal 0.11  -Retroperitoneal US normal sizes  -UA unremarkable, still oliguric.   -his Cr worsened and started on HRS protocol, midodrine 5 mg TID, octreotide 100 mg subcut TID and albumin q6h as recommend by nephrology  -- still has low blood pressure increased his midodrine to 10 mg TID, albumin decreased to once daily per hepatology request.       Hepatic encephalopathy  -Grade II-III, ammonia elevated, likely 2/2 decompensated cirrhosis  - still alerted with +astrexis  -Lactulose TID, rifaximin  -Seizure precautions, fall precautions, aspiration precautions  -Neurochecks q4h  -Head CT was negative for acute events and utox is negative.     Hypercalcemia  - corrected Ca on admission is 12.4  - PTH is low normal --> unlikely hyperparathyroidism   - vitamin D is low -- > unlikely vitamin D toxicity or granulomatous diseases   - TSH is normal   --  PTHrP, SPEP,UPEP, in process      Coagulopathy  -Likely 2/2 liver dz,   - vitamin K 10 mg orally for 3 days.       Thrombocytopenia, unspecified  -Likely 2/2 liver dz,       Macrocytic anemia  -Most likely due to chronic dz  -Iron studies pending  -Vit B12, folate normal  - his H/H low, will take consent and order PRBC transfusion       Protein-calorie malnutrition, severe  -Likely 2/2 malnutrition from EtOH and liver dz  -Nutrition consulted  - on thiamine       Hepatitis C antibody test positive  -- ordered Hep C RNA quantitative,  PCR,      Hypokalemia, resolved as of 2/12/2017  -Replaced      VTE Risk Mitigation         Ordered     Medium Risk of VTE  Once      02/11/17 0702     Place sequential compression device  Until discontinued      02/11/17 0702     Place AMA hose  Until discontinued      02/11/17 0702          Ryan Guthrie MD  Department of Hospital Medicine   [facility]

## 2017-02-13 NOTE — ASSESSMENT & PLAN NOTE
-Most likely due to chronic dz  -Iron studies pending  -Vit B12, folate normal  - his H/H low, will take consent and order PRBC transfusion

## 2017-02-13 NOTE — ASSESSMENT & PLAN NOTE
-Cr. 2.0 on unknown baseline, unknown etiology possibly pre-renal vs ATN vs HRS  -Urine sodium < 20 with FENa with pre-renal picture, urine protein/creatinine ratio normal 0.11  -Retroperitoneal US normal sizes  -UA unremarkable, still oliguric.   -his Cr worsened and started on HRS protocol, midodrine 5 mg TID, octreotide 100 mg subcut TID and albumin q6h as recommend by nephrology  -- still has low blood pressure increased his midodrine to 10 mg TID, albumin decreased to once daily per hepatology request.

## 2017-02-13 NOTE — PROGRESS NOTES
Notified Dr. Guthrie that patient's BP is 86/52 after attempting to obtain multiple times. MD ordered to increase Midodrine to 10mg starting at AM dose. Will give and continue to monitor.

## 2017-02-13 NOTE — SUBJECTIVE & OBJECTIVE
Interval History: patient seen today, still alerted, but not in pain or discomfort, not agitated, passed 3BM yesterday no abdominal pain, no fever. Still oliguric with  cc    Review of Systems   Constitutional: Negative for chills and fever.   Respiratory: Negative for cough and shortness of breath.    Cardiovascular: Positive for leg swelling. Negative for chest pain and palpitations.   Gastrointestinal: Positive for abdominal distention. Negative for abdominal pain, constipation, nausea and vomiting.   Genitourinary: Positive for decreased urine volume. Negative for dysuria.   Musculoskeletal: Negative for neck pain.   Psychiatric/Behavioral: Positive for confusion. Negative for agitation.     Objective:     Vital Signs (Most Recent):  Temp: 97.6 °F (36.4 °C) (02/13/17 1100)  Pulse: 106 (02/13/17 1300)  Resp: 18 (02/13/17 1100)  BP: 138/73 (02/13/17 1100)  SpO2: 100 % (02/13/17 1100) Vital Signs (24h Range):  Temp:  [97.2 °F (36.2 °C)-98.2 °F (36.8 °C)] 97.6 °F (36.4 °C)  Pulse:  [] 106  Resp:  [16-18] 18  SpO2:  [94 %-100 %] 100 %  BP: ()/(52-73) 138/73     Weight: 81.6 kg (180 lb)  Body mass index is 27.37 kg/(m^2).    Intake/Output Summary (Last 24 hours) at 02/13/17 1435  Last data filed at 02/13/17 1300   Gross per 24 hour   Intake              680 ml   Output              800 ml   Net             -120 ml      Physical Exam   Constitutional: No distress.   Cachectic    HENT:   Head: Normocephalic and atraumatic.   Eyes: Scleral icterus is present.   Pale conjunctivae    Neck: No JVD present.   Cardiovascular: Normal rate, regular rhythm and normal heart sounds.    No murmur heard.  Pulmonary/Chest: Effort normal and breath sounds normal. No respiratory distress. He has no wheezes. He has no rales.   Abdominal: Bowel sounds are normal. He exhibits distension. There is no tenderness.   Tense and distended   Musculoskeletal: He exhibits edema (+3 edema till thigh).   Neurological:    Disoriented x3  + astrexis    Skin: He is not diaphoretic.   + spider angioma        Significant Labs:     Bilirubin:   Recent Labs  Lab 02/11/17  0011 02/11/17  0744 02/12/17  0446 02/13/17  0510   BILITOT 3.1* 3.0* 3.1* 2.5*     BMP:   Recent Labs  Lab 02/13/17  0510   GLU 75   *   K 3.7   CL 98   CO2 27   BUN 30*   CREATININE 2.1*   CALCIUM 10.3   MG 1.6     CBC:   Recent Labs  Lab 02/12/17  0446 02/13/17  0510   WBC 13.13* 5.97   HGB 7.8* 6.8*   HCT 21.7* 19.7*   PLT 68* 52*     CMP:   Recent Labs  Lab 02/12/17  0446 02/13/17  0510   * 134*   K 3.9 3.7   CL 96 98   CO2 27 27   GLU 83 75   BUN 30* 30*   CREATININE 2.3* 2.1*   CALCIUM 10.4 10.3   PROT 6.5 6.2   ALBUMIN 2.1* 3.1*   BILITOT 3.1* 2.5*   ALKPHOS 103 62   AST 88* 56*   ALT 30 20   ANIONGAP 8 9   EGFRNONAA 31.5* 35.1*     Coagulation:   Recent Labs  Lab 02/13/17  0510   INR 2.7*     POCT Glucose:   Recent Labs  Lab 02/12/17  2330 02/13/17  0532 02/13/17  1250   POCTGLUCOSE 91 72 122*

## 2017-02-13 NOTE — PROGRESS NOTES
Progress Note  Hepatology    Admit Date: 2/10/2017   LOS: 2 days     SUBJECTIVE:     Follow-up For: Decompensated cirrhosis - transplant evaluation    HPI: Ricky Parsons is a 51 y.o. gentleman with PMH of CVA , wheelchair bound, EtOH abuse who [place] [facility] [place] [facility] for hepatology evaluation. Per report pt presented to OSH after a fall from chair and generalized weakness. His wife reports that he has a history of seizures but she cannot recall last seizure. They live in [place]. The patient has [place] medicaid.     Interval history: More awake today however currently does not seem to be fully alert. Wife is at bedside, of note she also has a history of recent stroke and providing history by her might also be influenced per chart review from other providers. No problems overnight.     Scheduled Meds:   [START ON 2/14/2017] albumin human 25%  25 g Intravenous Daily    cefTRIAXone (ROCEPHIN) IVPB  1 g Intravenous Q24H    folic acid  1 mg Oral Daily    lactulose  20 g Oral TID    midodrine  10 mg Oral TID WM    multivitamin  1 tablet Oral Daily    octreotide  100 mcg Subcutaneous Q8H    pantoprazole  40 mg Oral Daily    phytonadione  10 mg Oral Daily    potassium chloride  10 mEq Oral Daily    potassium chloride  50 mEq Oral Daily    rifAXIMimin  550 mg Oral BID    thiamine (VITAMIN B1) IVPB  100 mg Intravenous Q24H     Continuous Infusions:   PRN Meds:dextrose 50%, dextrose 50%, glucagon (human recombinant), glucose, glucose, lorazepam, ondansetron    OBJECTIVE:     Physical Exam:  Vital Signs (Most Recent)  Temp: 97.6 °F (36.4 °C) (02/13/17 1100)  Pulse: 106 (02/13/17 1300)  Resp: 18 (02/13/17 1100)  BP: 138/73 (02/13/17 1100)  SpO2: 100 % (02/13/17 1100)    Temperature Range Min/Max (Last 24H):  Temp:  [97.2 °F (36.2 °C)-98.2 °F (36.8 °C)]     General appearance: alert, appears stated age and cooperative.  Eyes: negative findings: conjunctivae and scleral  "icterus.   Throat: lips, mucosa, and tongue normal.  Neck, no JVD, trachea midline  Abdomen: soft, non-tender; bowel sounds normal; no masses, distended  Skin: No rashes or lesions to exposed areas; jaundice appreciated.  Ext, LE edema +1    Laboratory:    Recent Labs  Lab 02/11/17  0744 02/12/17  0446 02/13/17  0510   * 131* 134*   K 3.1* 3.9 3.7   CL 93* 96 98   CO2 31* 27 27   BUN 24* 30* 30*   CREATININE 1.9* 2.3* 2.1*   CALCIUM 10.3 10.4 10.3   PROT 6.4 6.5 6.2   BILITOT 3.0* 3.1* 2.5*   ALKPHOS 90 103 62   ALT 27 30 20   AST 74* 88* 56*         Recent Labs  Lab 02/11/17  0010 02/12/17  0446 02/13/17  0510   WBC 10.70 13.13* 5.97   HGB 8.8* 7.8* 6.8*   HCT 24.4* 21.7* 19.7*   PLT 87* 68* 52*         Recent Labs  Lab 02/11/17  0744 02/12/17  0446 02/13/17  0510   INR 2.4* 2.6* 2.7*       MELD-Na score: 29 at 2/13/2017  5:10 AM  MELD score: 28 at 2/13/2017  5:10 AM  Calculated from:  Serum Creatinine: 2.1 mg/dL at 2/13/2017  5:10 AM  Serum Sodium: 134 mmol/L at 2/13/2017  5:10 AM  Total Bilirubin: 2.5 mg/dL at 2/13/2017  5:10 AM  INR(ratio): 2.7 at 2/13/2017  5:10 AM  Age: 51 years    Assessment:  Ricky Parsons is a 51 y.o. gentleman with PMH of CVA , wheelchair bound, EtOH abuse and reported seizure history who presents to [facility] as a transfer from [facility] for hepatology evaluation.    1. Decompensated (suspected) alcohol cirrhosis - history is limited given encephalopathy. His liver disease is complicated by:  · Hepatic encephalopathy  · Ascites  2. Seizure disorder - not clear the history behind this. Patient's wife states he "drops" at home frequently.    Plan:  1. Patient is not a transplant candidate given insurance - [place] medicaid.   2. Recommend diagnostic paracentesis.   3. Continue lactulose and titrate to 3-4 BM's per day.   4. Awaiting HIV.     Staff attestation to follow          "

## 2017-02-13 NOTE — PLAN OF CARE
Problem: Patient Care Overview  Goal: Individualization & Mutuality  Patient progressing well. Patient remains on bedrest, rotating self in bed. Patient denies pain at this time. Patient's Albumin spaced out further. Fall precautions in place, call bell within reach. Reviewed POC with patient and wife, will continue to monitor.

## 2017-02-13 NOTE — ASSESSMENT & PLAN NOTE
- from limited history most likely 2/2 EtOH cirrhosis. Does not appear to have transaminitis elevation >1000k so tylenol, acute hep, shock, Budd Chiari less likely. Also consider autoimmune, morgan's, chronic viral hepatitis, hemachromatosis  -Hep panel with hep C Ab positive. Negative hep B surface Ag  Hepatitis B Surface Ag Negative   Hep B C IgM Negative   Hep A IgM Negative   Hepatitis C Ab Positive (A)     HIV ab1/2 negative.     -IgG elevated, anti smooth muscle ab negative, ceruloplasmin low  -Ferritin pending  -US Liver w/ doppler with micronodular cirrhosis   -Would like to perform paracentesis, contacted step-daughter for consent, already on rocephin for high WBC and possible SBP  -UA clear, CxR without acute cardiopulmonary abnormality, BCx NGTD  -CTX 1g q24h for SBP ppx    MELD-Na score: 29 at 2/13/2017  5:10 AM  MELD score: 28 at 2/13/2017  5:10 AM  Calculated from:  Serum Creatinine: 2.1 mg/dL at 2/13/2017  5:10 AM  Serum Sodium: 134 mmol/L at 2/13/2017  5:10 AM  Total Bilirubin: 2.5 mg/dL at 2/13/2017  5:10 AM  INR(ratio): 2.7 at 2/13/2017  5:10 AM  Age: 51 years  -Hepatology on board and recommend giving albumin daily instead of q6h, not candidate for liver transplant evaluation.

## 2017-02-14 PROBLEM — E83.42 HYPOMAGNESEMIA: Status: ACTIVE | Noted: 2017-02-14

## 2017-02-14 LAB
ALBUMIN FLD-MCNC: 0.8 G/DL
ALBUMIN SERPL BCP-MCNC: 2.6 G/DL
ALBUMIN SERPL ELPH-MCNC: 2.89 G/DL
ALP SERPL-CCNC: 58 U/L
ALPHA1 GLOB SERPL ELPH-MCNC: 0.19 G/DL
ALPHA2 GLOB SERPL ELPH-MCNC: 0.25 G/DL
ALT SERPL W/O P-5'-P-CCNC: 22 U/L
ANION GAP SERPL CALC-SCNC: 7 MMOL/L
ANISOCYTOSIS BLD QL SMEAR: SLIGHT
APPEARANCE FLD: NORMAL
AST SERPL-CCNC: 58 U/L
B-GLOBULIN SERPL ELPH-MCNC: 0.67 G/DL
BASOPHILS # BLD AUTO: 0.02 K/UL
BASOPHILS NFR BLD: 0.4 %
BILIRUB SERPL-MCNC: 3.8 MG/DL
BLD PROD TYP BPU: NORMAL
BLOOD UNIT EXPIRATION DATE: NORMAL
BLOOD UNIT TYPE CODE: 7300
BLOOD UNIT TYPE: NORMAL
BODY FLD TYPE: NORMAL
BODY FLUID SOURCE, LDH: NORMAL
BUN SERPL-MCNC: 27 MG/DL
CALCIUM SERPL-MCNC: 9.4 MG/DL
CHLORIDE SERPL-SCNC: 104 MMOL/L
CO2 SERPL-SCNC: 26 MMOL/L
CODING SYSTEM: NORMAL
COLOR FLD: NORMAL
CREAT SERPL-MCNC: 1.6 MG/DL
DIFFERENTIAL METHOD: ABNORMAL
DISPENSE STATUS: NORMAL
EOSINOPHIL # BLD AUTO: 0.1 K/UL
EOSINOPHIL NFR BLD: 1.9 %
ERYTHROCYTE [DISTWIDTH] IN BLOOD BY AUTOMATED COUNT: 25.8 %
EST. GFR  (AFRICAN AMERICAN): 56.4 ML/MIN/1.73 M^2
EST. GFR  (NON AFRICAN AMERICAN): 48.8 ML/MIN/1.73 M^2
GAMMA GLOB SERPL ELPH-MCNC: 1.91 G/DL
GLUCOSE SERPL-MCNC: 74 MG/DL
GRAM STN SPEC: NORMAL
GRAM STN SPEC: NORMAL
HCT VFR BLD AUTO: 24.1 %
HGB BLD-MCNC: 8.4 G/DL
HYPOCHROMIA BLD QL SMEAR: ABNORMAL
INR PPP: 2.5
LDH FLD L TO P-CCNC: 50 U/L
LYMPHOCYTES # BLD AUTO: 1.7 K/UL
LYMPHOCYTES NFR BLD: 32.6 %
LYMPHOCYTES NFR FLD MANUAL: 57 %
MAGNESIUM SERPL-MCNC: 1.1 MG/DL
MCH RBC QN AUTO: 35.9 PG
MCHC RBC AUTO-ENTMCNC: 34.9 %
MCV RBC AUTO: 103 FL
MESOTHL CELL NFR FLD MANUAL: 3 %
MONOCYTES # BLD AUTO: 0.5 K/UL
MONOCYTES NFR BLD: 10.2 %
MONOS+MACROS NFR FLD MANUAL: 20 %
NEUTROPHILS # BLD AUTO: 2.9 K/UL
NEUTROPHILS NFR BLD: 54.9 %
NEUTROPHILS NFR FLD MANUAL: 20 %
NUM UNITS TRANS FFP: NORMAL
OB PNL STL: POSITIVE
PATHOLOGIST INTERPRETATION SPE: NORMAL
PHOSPHATE SERPL-MCNC: 2.2 MG/DL
PLATELET # BLD AUTO: 46 K/UL
PLATELET BLD QL SMEAR: ABNORMAL
PMV BLD AUTO: 10 FL
POCT GLUCOSE: 81 MG/DL (ref 70–110)
POCT GLUCOSE: 82 MG/DL (ref 70–110)
POCT GLUCOSE: 91 MG/DL (ref 70–110)
POIKILOCYTOSIS BLD QL SMEAR: SLIGHT
POLYCHROMASIA BLD QL SMEAR: ABNORMAL
POTASSIUM SERPL-SCNC: 3.9 MMOL/L
PROT 24H UR-MRATE: 124 MG/SPEC
PROT FLD-MCNC: 1.6 G/DL
PROT SERPL-MCNC: 5.6 G/DL
PROT SERPL-MCNC: 5.9 G/DL
PROT UR-MCNC: 13 MG/DL
PROTHROMBIN TIME: 25.1 SEC
RBC # BLD AUTO: 2.34 M/UL
SODIUM SERPL-SCNC: 137 MMOL/L
SPECIMEN SOURCE: NORMAL
SPECIMEN SOURCE: NORMAL
URINE COLLECTION DURATION: 24 HR
URINE VOLUME: 950 ML
WBC # BLD AUTO: 5.27 K/UL
WBC # FLD: 49 /CU MM

## 2017-02-14 PROCEDURE — 63600175 PHARM REV CODE 636 W HCPCS: Performed by: STUDENT IN AN ORGANIZED HEALTH CARE EDUCATION/TRAINING PROGRAM

## 2017-02-14 PROCEDURE — 25000003 PHARM REV CODE 250: Performed by: INTERNAL MEDICINE

## 2017-02-14 PROCEDURE — 84100 ASSAY OF PHOSPHORUS: CPT

## 2017-02-14 PROCEDURE — 84157 ASSAY OF PROTEIN OTHER: CPT

## 2017-02-14 PROCEDURE — 84166 PROTEIN E-PHORESIS/URINE/CSF: CPT | Mod: 26,,, | Performed by: PATHOLOGY

## 2017-02-14 PROCEDURE — 99233 SBSQ HOSP IP/OBS HIGH 50: CPT | Mod: ,,, | Performed by: INTERNAL MEDICINE

## 2017-02-14 PROCEDURE — 63600175 PHARM REV CODE 636 W HCPCS: Performed by: INTERNAL MEDICINE

## 2017-02-14 PROCEDURE — 83615 LACTATE (LD) (LDH) ENZYME: CPT

## 2017-02-14 PROCEDURE — 36430 TRANSFUSION BLD/BLD COMPNT: CPT

## 2017-02-14 PROCEDURE — 82042 OTHER SOURCE ALBUMIN QUAN EA: CPT

## 2017-02-14 PROCEDURE — 87070 CULTURE OTHR SPECIMN AEROBIC: CPT

## 2017-02-14 PROCEDURE — 25000003 PHARM REV CODE 250: Performed by: STUDENT IN AN ORGANIZED HEALTH CARE EDUCATION/TRAINING PROGRAM

## 2017-02-14 PROCEDURE — 87205 SMEAR GRAM STAIN: CPT

## 2017-02-14 PROCEDURE — 85025 COMPLETE CBC W/AUTO DIFF WBC: CPT

## 2017-02-14 PROCEDURE — P9017 PLASMA 1 DONOR FRZ W/IN 8 HR: HCPCS

## 2017-02-14 PROCEDURE — 80053 COMPREHEN METABOLIC PANEL: CPT

## 2017-02-14 PROCEDURE — 0W9G3ZX DRAINAGE OF PERITONEAL CAVITY, PERCUTANEOUS APPROACH, DIAGNOSTIC: ICD-10-PCS | Performed by: RADIOLOGY

## 2017-02-14 PROCEDURE — 83735 ASSAY OF MAGNESIUM: CPT

## 2017-02-14 PROCEDURE — P9047 ALBUMIN (HUMAN), 25%, 50ML: HCPCS | Performed by: STUDENT IN AN ORGANIZED HEALTH CARE EDUCATION/TRAINING PROGRAM

## 2017-02-14 PROCEDURE — 99232 SBSQ HOSP IP/OBS MODERATE 35: CPT | Mod: ,,, | Performed by: INTERNAL MEDICINE

## 2017-02-14 PROCEDURE — P9047 ALBUMIN (HUMAN), 25%, 50ML: HCPCS | Performed by: INTERNAL MEDICINE

## 2017-02-14 PROCEDURE — 97803 MED NUTRITION INDIV SUBSEQ: CPT

## 2017-02-14 PROCEDURE — 84166 PROTEIN E-PHORESIS/URINE/CSF: CPT

## 2017-02-14 PROCEDURE — 63600175 PHARM REV CODE 636 W HCPCS: Performed by: HOSPITALIST

## 2017-02-14 PROCEDURE — 25000003 PHARM REV CODE 250: Performed by: HOSPITALIST

## 2017-02-14 PROCEDURE — 36415 COLL VENOUS BLD VENIPUNCTURE: CPT

## 2017-02-14 PROCEDURE — 89051 BODY FLUID CELL COUNT: CPT

## 2017-02-14 PROCEDURE — 20600001 HC STEP DOWN PRIVATE ROOM

## 2017-02-14 PROCEDURE — 82272 OCCULT BLD FECES 1-3 TESTS: CPT

## 2017-02-14 PROCEDURE — 85610 PROTHROMBIN TIME: CPT

## 2017-02-14 PROCEDURE — 87075 CULTR BACTERIA EXCEPT BLOOD: CPT

## 2017-02-14 PROCEDURE — 84156 ASSAY OF PROTEIN URINE: CPT

## 2017-02-14 RX ORDER — LACTULOSE 10 G/15ML
30 SOLUTION ORAL 3 TIMES DAILY
Status: DISCONTINUED | OUTPATIENT
Start: 2017-02-14 | End: 2017-02-15

## 2017-02-14 RX ORDER — MAGNESIUM SULFATE HEPTAHYDRATE 40 MG/ML
2 INJECTION, SOLUTION INTRAVENOUS ONCE
Status: COMPLETED | OUTPATIENT
Start: 2017-02-14 | End: 2017-02-14

## 2017-02-14 RX ORDER — ALBUMIN HUMAN 250 G/1000ML
25 SOLUTION INTRAVENOUS ONCE
Status: COMPLETED | OUTPATIENT
Start: 2017-02-14 | End: 2017-02-14

## 2017-02-14 RX ORDER — POTASSIUM CHLORIDE 20 MEQ/15ML
20 SOLUTION ORAL ONCE
Status: COMPLETED | OUTPATIENT
Start: 2017-02-14 | End: 2017-02-14

## 2017-02-14 RX ORDER — HYDROCODONE BITARTRATE AND ACETAMINOPHEN 500; 5 MG/1; MG/1
TABLET ORAL
Status: DISCONTINUED | OUTPATIENT
Start: 2017-02-14 | End: 2017-02-15

## 2017-02-14 RX ORDER — SODIUM,POTASSIUM PHOSPHATES 280-250MG
1 POWDER IN PACKET (EA) ORAL
Status: COMPLETED | OUTPATIENT
Start: 2017-02-14 | End: 2017-02-15

## 2017-02-14 RX ADMIN — OCTREOTIDE ACETATE 100 MCG: 100 INJECTION, SOLUTION INTRAVENOUS; SUBCUTANEOUS at 02:02

## 2017-02-14 RX ADMIN — PHYTONADIONE 10 MG: 5 TABLET ORAL at 09:02

## 2017-02-14 RX ADMIN — RIFAXIMIN 550 MG: 550 TABLET ORAL at 09:02

## 2017-02-14 RX ADMIN — ALBUMIN (HUMAN) 25 G: 12.5 SOLUTION INTRAVENOUS at 04:02

## 2017-02-14 RX ADMIN — ALBUMIN (HUMAN) 25 G: 12.5 SOLUTION INTRAVENOUS at 09:02

## 2017-02-14 RX ADMIN — POTASSIUM & SODIUM PHOSPHATES POWDER PACK 280-160-250 MG 1 PACKET: 280-160-250 PACK at 09:02

## 2017-02-14 RX ADMIN — POTASSIUM & SODIUM PHOSPHATES POWDER PACK 280-160-250 MG 1 PACKET: 280-160-250 PACK at 04:02

## 2017-02-14 RX ADMIN — PANTOPRAZOLE SODIUM 40 MG: 40 TABLET, DELAYED RELEASE ORAL at 09:02

## 2017-02-14 RX ADMIN — CEFTRIAXONE 1 G: 1 INJECTION, SOLUTION INTRAVENOUS at 04:02

## 2017-02-14 RX ADMIN — THERA TABS 1 TABLET: TAB at 09:02

## 2017-02-14 RX ADMIN — LACTULOSE 20 G: 10 SOLUTION ORAL at 01:02

## 2017-02-14 RX ADMIN — LACTULOSE 20 G: 10 SOLUTION ORAL at 05:02

## 2017-02-14 RX ADMIN — MIDODRINE HYDROCHLORIDE 10 MG: 2.5 TABLET ORAL at 04:02

## 2017-02-14 RX ADMIN — MIDODRINE HYDROCHLORIDE 10 MG: 2.5 TABLET ORAL at 09:02

## 2017-02-14 RX ADMIN — OCTREOTIDE ACETATE 100 MCG: 100 INJECTION, SOLUTION INTRAVENOUS; SUBCUTANEOUS at 10:02

## 2017-02-14 RX ADMIN — MIDODRINE HYDROCHLORIDE 10 MG: 2.5 TABLET ORAL at 12:02

## 2017-02-14 RX ADMIN — FOLIC ACID 1 MG: 1 TABLET ORAL at 09:02

## 2017-02-14 RX ADMIN — LACTULOSE 30 G: 10 SOLUTION ORAL at 09:02

## 2017-02-14 RX ADMIN — MAGNESIUM SULFATE IN WATER 2 G: 40 INJECTION, SOLUTION INTRAVENOUS at 10:02

## 2017-02-14 RX ADMIN — OCTREOTIDE ACETATE 100 MCG: 100 INJECTION, SOLUTION INTRAVENOUS; SUBCUTANEOUS at 06:02

## 2017-02-14 RX ADMIN — SODIUM FERRIC GLUCONATE COMPLEX 125 MG: 12.5 INJECTION INTRAVENOUS at 12:02

## 2017-02-14 RX ADMIN — POTASSIUM CHLORIDE 20 MEQ: 20 SOLUTION ORAL at 10:02

## 2017-02-14 NOTE — PLAN OF CARE
Problem: Patient Care Overview  Goal: Plan of Care Review  Outcome: Ongoing (interventions implemented as appropriate)  Pt disoriented x 4 overnight. Pt received 1 unit of PRBC overnight. Pt has a watts placed by urology. Pt undergoing a 24 urine collection that started at 8 pm. Pt NPO since midnight due to paracentesis to be done today. Pt had 3 BM overnight. Pt restless and anxious at beginning of night. BG checked every 6 hours. Pt has redness to the inner thighs and slight skin breakdown the the left buttocks. Pt on telemetry running afib. Pt resting with bed alarm on at this time.

## 2017-02-14 NOTE — ASSESSMENT & PLAN NOTE
-Reports heavy EtOH consumption, unsure of last drink, his wife mentioned frequent seizure after d/c of alcohol   -Thiamine, folate, multivitamin

## 2017-02-14 NOTE — ASSESSMENT & PLAN NOTE
- corrected Ca on admission is 12.4  - PTH is low normal --> unlikely hyperparathyroidism   - vitamin D is low -- > unlikely vitamin D toxicity or granulomatous diseases   - TSH is normal   --  PTHrP, UPEP, in process  SPEP: Decreased total protein.   Increased gamma globulin, polyclonal.   No paraprotein bands are detected; cirrhotic pattern.

## 2017-02-14 NOTE — PROCEDURES
Radiology Post-Procedure Note    Pre Op Diagnosis: Ascites  Post Op Diagnosis: Same    Procedure: Ultrasound Guided Paracentesis    Procedure performed by: Leonora WELCH, So     Written Informed Consent Obtained: Yes  Specimen Removed: YES az  Estimated Blood Loss: Minimal    Findings:   Successful paracentesis.  Albumin administered PRN per protocol.    Patient tolerated procedure well.    So Lea, APRN, FNP  Interventional Radiology  [phone] spectralink

## 2017-02-14 NOTE — PROGRESS NOTES
Progress Note  Hepatology    Admit Date: 2/10/2017   LOS: 3 days     SUBJECTIVE:     Follow-up For: Decompensated cirrhosis - transplant evaluation    HPI: Ricky Parsons is a 51 y.o. gentleman with PMH of CVA , wheelchair bound, EtOH abuse who presents to [facility] as a transfer from [facility] for hepatology evaluation. Per report pt presented to OSH after a fall from chair and generalized weakness. His wife reports that he has a history of seizures but she cannot recall last seizure. They live in [place]. The patient has [place] medicaid.     Interval history: pt is more awake today, sitting in chair and eating breakfast . Wife is at bedside, of note she also has a history of recent stroke and providing history by her might also be influenced per chart review from other providers. No problems overnight. Pt was transfused 1 U PRBC and ordered vit K, denies melena or hematochezia    Scheduled Meds:   albumin human 25%  25 g Intravenous Daily    cefTRIAXone (ROCEPHIN) IVPB  1 g Intravenous Q24H    ferric gluconate (FERRLECIT) IVPB  125 mg Intravenous Every Tues, Thurs, Sat    folic acid  1 mg Oral Daily    lactulose  20 g Oral TID    midodrine  10 mg Oral TID WM    multivitamin  1 tablet Oral Daily    octreotide  100 mcg Subcutaneous Q8H    pantoprazole  40 mg Oral Daily    phytonadione  10 mg Oral Daily    rifAXIMimin  550 mg Oral BID    thiamine (VITAMIN B1) IVPB  100 mg Intravenous Q24H     Continuous Infusions:   PRN Meds:sodium chloride, sodium chloride, dextrose 50%, dextrose 50%, glucagon (human recombinant), glucose, glucose, lorazepam, ondansetron    OBJECTIVE:     Physical Exam:  Vital Signs (Most Recent)  Temp: 98.3 °F (36.8 °C) (02/14/17 1131)  Pulse: 102 (02/14/17 1131)  Resp: 16 (02/14/17 1131)  BP: 105/74 (02/14/17 1131)  SpO2: 99 % (02/14/17 1131)    Temperature Range Min/Max (Last 24H):  Temp:  [97.5 °F (36.4 °C)-98.8 °F (37.1 °C)]     General appearance: alert, appears  stated age and cooperative.  Eyes: negative findings: conjunctivae and scleral icterus.   Throat: lips, mucosa, and tongue normal.  Neck, no JVD, trachea midline  Abdomen: soft, non-tender; bowel sounds normal; no masses, distended  Skin: No rashes or lesions to exposed areas; jaundice appreciated.  Ext, LE edema +1    Laboratory:    Recent Labs  Lab 02/12/17  0446 02/13/17  0510 02/14/17  0514   * 134* 137   K 3.9 3.7 3.9   CL 96 98 104   CO2 27 27 26   BUN 30* 30* 27*   CREATININE 2.3* 2.1* 1.6*   CALCIUM 10.4 10.3 9.4   PROT 6.5 6.2 5.6*   BILITOT 3.1* 2.5* 3.8*   ALKPHOS 103 62 58   ALT 30 20 22   AST 88* 56* 58*         Recent Labs  Lab 02/11/17  0010 02/12/17 0446 02/13/17  0510 02/14/17  0514   WBC 10.70 13.13* 5.97 5.27   HGB 8.8* 7.8* 6.8* 8.4*   HCT 24.4* 21.7* 19.7* 24.1*   PLT 87* 68* 52*  --          Recent Labs  Lab 02/12/17 0446 02/13/17  0510 02/14/17  0514   INR 2.6* 2.7* 2.5*       MELD-Na score: 26 at 2/14/2017  5:14 AM  MELD score: 26 at 2/14/2017  5:14 AM  Calculated from:  Serum Creatinine: 1.6 mg/dL at 2/14/2017  5:14 AM  Serum Sodium: 137 mmol/L at 2/14/2017  5:14 AM  Total Bilirubin: 3.8 mg/dL at 2/14/2017  5:14 AM  INR(ratio): 2.5 at 2/14/2017  5:14 AM  Age: 51 years    Assessment:  Ricky Parsons is a 51 y.o. gentleman with PMH of CVA , wheelchair bound, EtOH abuse and reported seizure history who [facility] for hepatology evaluation.    1. Decompensated (suspected) alcohol cirrhosis - history is limited given encephalopathy. His liver disease is complicated by:  · Hepatic encephalopathy  · Ascites      Plan:  1. Patient is not a transplant candidate given insurance - [place] medicaid, ongoing substance abuse issues, poor insight into disease.   2. Recommend diagnostic paracentesis.   3. Continue lactulose and titrate to 3-4 BM's per day.   4. Recommend an EGD prior to pt's getting discharge, we will try to arrange for that     Staff  attestation to follow

## 2017-02-14 NOTE — PLAN OF CARE
Discharge Plan: YANICK provided a list of Nursing Home placements in [place] and [place] area. Pt will have to discharge to a nursing home in [place] because pt is a [place] resident and will need to qualify for medicaid. Sw was trying to explain to pt's wife that she would not qualify for nursing home placement with the pt unless her family sign her in into nursing by going to her PCP and getting medical records. Pt's wife appeared that she was not understanding therefore SW called and spoke to pt's daughter Tashia Sher [phone]. Tashia confirmed that pt and pt's wife, live in unsafe conditions. YANICK explained that case mgmt can assisted with nursing home placement for pt Ricky but for pt's wife, it is the family responsibility. Tashia Sher verbalized understanding however Tashia states she cannot provide a place for her mother. Tashia also stated that she will contact her aunt to see if she can help. Yanick will continue to follow.    Nannette Quintero, YANICK  [phone]

## 2017-02-14 NOTE — SUBJECTIVE & OBJECTIVE
Interval History: patient seen today, underwent paracentesis 5 L of fluid, vitally stable, still alerted.     Review of Systems   Constitutional: Negative for chills and fever.   Respiratory: Positive for wheezing. Negative for cough and shortness of breath.    Cardiovascular: Positive for leg swelling. Negative for chest pain and palpitations.   Gastrointestinal: Positive for abdominal distention. Negative for abdominal pain, constipation, nausea and vomiting.   Genitourinary: Positive for decreased urine volume. Negative for dysuria.   Musculoskeletal: Negative for neck pain.   Psychiatric/Behavioral: Positive for confusion. Negative for agitation.     Objective:     Vital Signs (Most Recent):  Temp: 98 °F (36.7 °C) (02/14/17 1241)  Pulse: (!) 114 (02/14/17 1400)  Resp: 16 (02/14/17 1241)  BP: 105/68 (02/14/17 1241)  SpO2: 98 % (02/14/17 1241) Vital Signs (24h Range):  Temp:  [97.5 °F (36.4 °C)-98.8 °F (37.1 °C)] 98 °F (36.7 °C)  Pulse:  [] 114  Resp:  [15-20] 16  SpO2:  [96 %-99 %] 98 %  BP: ()/(60-84) 105/68     Weight: 81.6 kg (180 lb)  Body mass index is 27.37 kg/(m^2).    Intake/Output Summary (Last 24 hours) at 02/14/17 1615  Last data filed at 02/14/17 1132   Gross per 24 hour   Intake           929.75 ml   Output             5500 ml   Net         -4570.25 ml      Physical Exam   Constitutional: No distress.   Cachectic    HENT:   Head: Normocephalic and atraumatic.   Eyes: Scleral icterus is present.   Pale conjunctivae    Neck: No JVD present.   Cardiovascular: Normal rate, regular rhythm and normal heart sounds.    No murmur heard.  Pulmonary/Chest: Effort normal and breath sounds normal. No respiratory distress. He has no wheezes. He has no rales.   Abdominal: Bowel sounds are normal. He exhibits no distension (after paracentesis ). There is no tenderness.   Tense and distended   Musculoskeletal: He exhibits edema (+3 edema till thigh).   Neurological:   Disoriented x3  + astrexis    Skin:  He is not diaphoretic.   + spider angioma        Significant Labs:   Bilirubin:   Recent Labs  Lab 02/11/17  0011 02/11/17  0744 02/12/17  0446 02/13/17  0510 02/14/17  0514   BILITOT 3.1* 3.0* 3.1* 2.5* 3.8*     CBC:   Recent Labs  Lab 02/13/17  0510 02/14/17  0514   WBC 5.97 5.27   HGB 6.8* 8.4*   HCT 19.7* 24.1*   PLT 52* 46*     CMP:   Recent Labs  Lab 02/13/17  0510 02/14/17  0514   * 137   K 3.7 3.9   CL 98 104   CO2 27 26   GLU 75 74   BUN 30* 27*   CREATININE 2.1* 1.6*   CALCIUM 10.3 9.4   PROT 6.2 5.6*   ALBUMIN 3.1* 2.6*   BILITOT 2.5* 3.8*   ALKPHOS 62 58   AST 56* 58*   ALT 20 22   ANIONGAP 9 7*   EGFRNONAA 35.1* 48.8*

## 2017-02-14 NOTE — ASSESSMENT & PLAN NOTE
-Most likely due to chronic dz  -Vit B12, folate normal  - received PRBC 1 unit yesterday with good response.

## 2017-02-14 NOTE — PROGRESS NOTES
Paracentesis complete. 5000 mLs peritoneal fluid drained. Pt tolerated well. Dressing to RLQ clean, dry, and intact. Pt receiving scheduled albumin on floor. Specimens sent per lab order. Report called to floor nurse.  Pt awaiting transport at this time.

## 2017-02-14 NOTE — ASSESSMENT & PLAN NOTE
- from limited history most likely 2/2 EtOH cirrhosis. Does not appear to have transaminitis elevation >1000k so tylenol, acute hep, shock, Budd Chiari less likely. Also consider autoimmune, morgan's, chronic viral hepatitis, hemachromatosis  -Hep panel with hep C Ab positive. Negative hep B surface Ag  Hepatitis B Surface Ag Negative   Hep B C IgM Negative   Hep A IgM Negative   Hepatitis C Ab Positive (A)   - f/u hep c PCR  HIV ab1/2 negative.     -IgG elevated, anti smooth muscle ab negative, ceruloplasmin low  -Ferritin pending  -US Liver w/ doppler with micronodular cirrhosis   -paracentesis was done with 5 liter of fluid., waiting for ascitic fluid studies.   -UA clear, CxR without acute cardiopulmonary abnormality, BCx NGTD  -CTX 1g q24h for SBP ppx    MELD-Na score: 26 at 2/14/2017  5:14 AM  MELD score: 26 at 2/14/2017  5:14 AM  Calculated from:  Serum Creatinine: 1.6 mg/dL at 2/14/2017  5:14 AM  Serum Sodium: 137 mmol/L at 2/14/2017  5:14 AM  Total Bilirubin: 3.8 mg/dL at 2/14/2017  5:14 AM  INR(ratio): 2.5 at 2/14/2017  5:14 AM  Age: 51 years  -Hepatology on board and recommend giving albumin daily instead of q6h, not candidate for liver transplant evaluation.

## 2017-02-14 NOTE — ASSESSMENT & PLAN NOTE
-Cr. 2.0 on unknown baseline, unknown etiology possibly pre-renal vs ATN vs HRS  -Urine sodium < 20 with FENa with pre-renal picture, urine protein/creatinine ratio normal 0.11  -Retroperitoneal US normal sizes  -UA unremarkable, still oliguric.   -- his cr improved to 1.6 with midodrine 10 mg TID, octreotide 10mcq TID, and albumin 25 gm daily

## 2017-02-14 NOTE — PROGRESS NOTES
[facility]  Adult Nutrition  Consult Note    SUMMARY     Recommendations    1. When medically feasible, resume 2 gram sodium diet, fluid per MD.   2. RD to monitor & follow-up.    Goals: PO intake >50%  Nutrition Goal Status: progressing towards goal  Communication of RD Recs: reviewed with RN    Reason for Assessment    Reason for Assessment: RD follow-up  Diagnosis: other (see comments) (Cirrhosis)  Relevent Medical History: Seizure disorder   Interdisciplinary Rounds: did not attend     General Information Comments: Pt NPO for paracentesis; Was on low sodium diet with 50% PO intake. Disoriented during visit.   Discharge planning: adequate PO intake    Nutrition Prescription Ordered    Current Diet Order: NPO     Nutrition Risk Screen     Nutrition Risk Screen: no indicators present    Nutrition/Diet History    Patient Reported Diet/Restrictions/Preferences: other (see comments) (ARCHIE)     Factors Affecting Nutritional Intake: NPO    Labs/Tests/Procedures/Meds    Pertinent Labs Reviewed: reviewed, pertinent  Pertinent Labs Comments: BUN 27, Creat 1.6, GFR 56.4, Bili 3.8  Pertinent Medications Reviewed: reviewed, pertinent  Pertinent Medications Comments: MVI, Folic acid, Thiamine    Physical Findings    Overall Physical Appearance: edematous  Oral/Mouth Cavity: WDL  Skin: intact    Anthropometrics    Height (inches): 67.99 in  Weight Method: Estimated  Weight (kg): 81.6 kg    Ideal Body Weight (IBW), Male: 153.94 lb  % Ideal Body Weight, Male (lb): 116.86 lb     BMI (kg/m2): 27.36  BMI Grade: 25 - 29.9 - overweight    Estimated/Assessed Needs    Weight Used For Calorie Calculations: 81.6 kg (179 lb 14.3 oz)   Height (cm): 172.7 cm     Energy Need Method: Scott-St Jeor, other (see comments) (3717-4037 kcal/d)     Weight Used For Protein Calculations: 81.6 kg (179 lb 14.3 oz)  Protein Requirements: 82-90 g/d    Fluid Need Method: RDA Method, other (see comments) (Per MD)     Monitor and  Evaluation    Food and Nutrient Intake: energy intake, food and beverage intake  Food and Nutrient Adminstration: diet order     Physical Activity and Function: nutrition-related ADLs and IADLs  Anthropometric Measurements: weight, weight change, body mass index  Biochemical Data, Medical Tests and Procedures: electrolyte and renal panel, glucose/endocrine profile, gastrointestinal profile, inflammatory profile, lipid profile  Nutrition-Focused Physical Findings: overall appearance    Nutrition Risk    Level of Risk: moderate    Nutrition Follow-Up    RD Follow-up?: Yes    Assessment and Plan    No nutritional dx at this time.

## 2017-02-14 NOTE — PROGRESS NOTES
Pt arrived to room 188 for paracentesis. Pt identified using two pt identifiers. Allergies reviewed. NAD noted. Phone consent obtained as patient is not able to consent for himself. Awaiting H&P at this time. FRANCISCA.

## 2017-02-14 NOTE — PROGRESS NOTES
Progress Note  Nephrology    Admit Date: 2/10/2017   LOS: 3 days     SUBJECTIVE:     Follow-up For: STEPHANIE, hyponatremia    Interval follow up: NAEON. Patient alert this morning with wife bedside. To go for paracentesis today. Received 1 unit of PRBC yesterday. With low iron stores on studies. Creatinine slowly trending down. Getting a 24 hour urine collection.     OBJECTIVE:     Vital Signs (Most Recent)  Temp: 98 °F (36.7 °C) (02/13/17 2340)  Pulse: 100 (02/14/17 0700)  Resp: 15 (02/13/17 2340)  BP: 108/74 (02/13/17 2340)  SpO2: 97 % (02/14/17 0400)    Vital Signs Range (Last 24H):  Temp:  [97.6 °F (36.4 °C)-98.8 °F (37.1 °C)]   Pulse:  []   Resp:  [15-20]   BP: ()/(57-74)   SpO2:  [91 %-100 %]       Date 02/14/17 0700 - 02/15/17 0659   Shift 6960-1754 7272-6208 3099-2672 24 Hour Total   I  N  T  A  K  E   Shift Total  (mL/kg)       O  U  T  P  U  T   Urine  (mL/kg/hr) 400   400    Shift Total  (mL/kg) 400  (4.9)   400  (4.9)   Weight (kg) 81.6 81.6 81.6 81.6     Physical Exam:   General: well developed, appears older than stated age, cachectic, icteric, no distress, l  HENT: Head:normocephalic, atraumatic.   Nose: Nares normal. Septum midline. Mucosa normal. No drainage or sinus tenderness., no discharge. Throat: lips, mucosa, and tongue normal; teeth and gums normal and no throat erythema.  Eyes: conjunctivae/corneas clear. PERRL.   Neck: no adenopathy, no carotid bruit, supple, symmetrical, trachea midline, no JVD and thyroid not enlarged, symmetric, no tenderness/mass/nodules  Lungs: clear to auscultation bilaterally, diminished breath sounds bilaterally and no wheeze, rales or rhonchi bilaterlly  Cardiovascular: Heart: regular rate and rhythm, S1, S2 normal, no murmur, click, rub or gallop. Chest Wall: no tenderness. Extremities: edema bialteral + 4 and sacral edema, no cyanosis and unable to palpate DP with edema. Pulses: 2+ and symmetric  not well palpated.  Abdomen/Rectal: Abdomen: distended,  tense nontender, uanble to palpate liver, spleen, decreased bowel sounds. Rectal: normal tone, no masses or tenderness and not examined  Skin: no evidence of bleeding or bruising, no lesions noted, temperature normal, texture normal and spider angioma or k  Musculoskeletal:no clubbing, cyanosis and unable to test movement or strength due to disorientation    Laboratory:  CBC:     Recent Labs  Lab 02/13/17  0510 02/14/17  0514   WBC 5.97 5.27   RBC 1.82* 2.34*   HGB 6.8* 8.4*   HCT 19.7* 24.1*   PLT 52*  --    * 103*   MCH 37.4* 35.9*   MCHC 34.5 34.9     CMP:     Recent Labs  Lab 02/14/17 0514   GLU 74   CALCIUM 9.4   ALBUMIN 2.6*   PROT 5.6*      K 3.9   CO2 26      BUN 27*   CREATININE 1.6*   ALKPHOS 58   ALT 22   AST 58*   BILITOT 3.8*       Diagnostic Results:  Labs: Reviewed  X-Ray: Reviewed    ASSESSMENT/PLAN:     Active Hospital Problems    Diagnosis  POA    *Decompensated hepatic cirrhosis [K72.90]  Yes    Hepatitis C antibody test positive [R76.8]  Yes    Moderate protein-calorie malnutrition [E44.0]  Yes    Hyponatremia [E87.1]  Yes    Acute renal failure [N17.9]  Yes    Hepatic encephalopathy [K72.90]  Yes    Hypercalcemia [E83.52]  Yes    Coagulopathy [D68.9]  Yes    Thrombocytopenia, unspecified [D69.6]  Yes    Macrocytic anemia [D53.9]  Yes    History of rapid strep test [Z92.89]  Not Applicable    Protein-calorie malnutrition, severe [E43]  Yes    ETOH abuse [F10.10]  Yes    Vitamin D insufficiency [E55.9]  Yes      Resolved Hospital Problems    Diagnosis Date Resolved POA    Hypokalemia [E87.6] 02/12/2017 Yes     50 yo wm with h/o ETOH abuse, CVA, found to be encephalopathic,icteric hyponatremic with tense ascites and serum creatinine of 2.0, unknown baseline, minimal proteinuria, lactic acid 3.3, normal imaging of kidneys.     STEPHANIE on unknown baseline likely pre- renal vs HRS  - Patient creatinine improving after blood transfusion   - Patient to get paracentesis  today. Make sure to provide albumin afterwards (6-8 grams per liter removed)  - Continue Midodrine, octeotride and albumin   - Will follow results of 24 hour urine collection. Last Urine protein/ creatinine ratio: 0.11  - Monitor strict I/Os, daily weights  - Will continue to monitor.    Hyponatremia   - Resolved  - Currently 137   - Continue on 1.5L fluid restriction     Anemia of Chronic Disease   - Will start patient on iron infusion with Ferlicit x 6 doses   - Pending of H/H levels after iron repletion will consider ULISSES   - Consider evaluation for GI bleeding    Mineral Bone Disease in CKD   - Start patient on ergocalciferol 50,000 units weekly x 8 weeks     Nito Ordoñez   Nephrology fellow PGY- 5  [phone]

## 2017-02-14 NOTE — H&P
Inpatient Radiology Pre-procedure Note    History of Present Illness:  Ricky Parsons is a 51 y.o. male who presents for ultrasound guided paracentesis.  Admission H&P reviewed.  Past Medical History   Diagnosis Date    Seizure disorder      Past Surgical History   Procedure Laterality Date    Right jaw       plate placement       Review of Systems:   As documented in primary team H&P    Home Meds:   Prior to Admission medications    Not on File     Scheduled Meds:    albumin human 25%  25 g Intravenous Daily    cefTRIAXone (ROCEPHIN) IVPB  1 g Intravenous Q24H    ferric gluconate (FERRLECIT) IVPB  125 mg Intravenous Every Tues, Thurs, Sat    folic acid  1 mg Oral Daily    lactulose  20 g Oral TID    magnesium sulfate IVPB  2 g Intravenous Once    midodrine  10 mg Oral TID WM    multivitamin  1 tablet Oral Daily    octreotide  100 mcg Subcutaneous Q8H    pantoprazole  40 mg Oral Daily    phytonadione  10 mg Oral Daily    rifAXIMimin  550 mg Oral BID    thiamine (VITAMIN B1) IVPB  100 mg Intravenous Q24H     Continuous Infusions:    PRN Meds:sodium chloride, sodium chloride, dextrose 50%, dextrose 50%, glucagon (human recombinant), glucose, glucose, lorazepam, ondansetron  Anticoagulants/Antiplatelets: no anticoagulation    Allergies: Review of patient's allergies indicates:  No Known Allergies  Sedation Hx: have not been any systemic reactions    Labs:    Recent Labs  Lab 02/14/17 0514   INR 2.5*       Recent Labs  Lab 02/13/17 0510 02/14/17 0514   WBC 5.97 5.27   HGB 6.8* 8.4*   HCT 19.7* 24.1*   * 103*   PLT 52*  --       Recent Labs  Lab 02/14/17 0514   GLU 74      K 3.9      CO2 26   BUN 27*   CREATININE 1.6*   CALCIUM 9.4   MG 1.1*   ALT 22   AST 58*   ALBUMIN 2.6*   BILITOT 3.8*         Vitals:  Temp: 97.5 °F (36.4 °C) (02/14/17 0911)  Pulse: 109 (02/14/17 0937)  Resp: 16 (02/14/17 0911)  BP: 101/71 (02/14/17 0911)  SpO2: 97 % (02/14/17 0911)     Physical Exam:  ASA:  3  Mallampati: n/a    General: no acute distress  Mental Status: alert and oriented to person, but not place and time, although able to recite birthday  HEENT: normocephalic, atraumatic  Chest: unlabored breathing  Heart: regular heart rate  Abdomen: distended  Extremity: moves all extremities    Plan: ultrasound guided paracentesis  Sedation Plan: local    JARAD Hurd FNP  Interventional Radiology  [phone] spectralink

## 2017-02-14 NOTE — PROGRESS NOTES
[facility] Hospital Medicine  Progress Note    Patient Name: Ricky Parsons  MRN: [id number]  Patient Class: IP- Inpatient   Admission Date: 2/10/2017  Length of Stay: 3 days  Attending Physician: Daniela Whiteside MD  Primary Care Provider: Provider [facility] Medicine Team: [facility] [place] [facility] 4 Ryan Guthrie MD    Subjective:     Principal Problem:Decompensated hepatic cirrhosis    HPI:  Mr. Parsons is a 52 yo male no reported PMH per pt, per OSH records PMH CVA and wheelchair bound who presents as a transfer from [facility] per documentation there after a fall from chair and generalized weakness. Was found to be disoriented with abdominal distension and ascites so [facility] [place] [facility]. Found to be hyponatremic with a lactic acidosis to 3.3 there but hemodynamically stable. On arrival noted to have stable vital signs and evaluated by ICU. He reported then being cold and having heartburn but currently has no complaints and is significantly drowsy. Denies taking any medications at home. Consumes about 6 beers/day but does not know last drink. Per OSH ED notes was seeing bugs on sheets.     Notable ED studies from [place], ABG 7.57/79.1/29.5. Rapid strep positive, troponin negative, EtOH negative, CPK 80. UDS negative. Attempted to contact Ms. Sher (listed in demographics as emergency contact) and number disconnected. No number for his spouse is provided in transfer documentation.        Hospital Course:  Patient started on lactulose and rifaximin for HE and on rocephin empirically, seen by nephrology and recommend midodrine, albumin and octreotide to improve his MAP and his STEPHANIE, hepatology is on board for liver eval and recommend diagnostic paracentesis, his H/H dropped , consents were taken for paracentesis and PRBC    Interval History: patient seen today, underwent paracentesis 5 L of fluid, vitally stable, still alerted.     Review of  Systems   Constitutional: Negative for chills and fever.   Respiratory: Positive for wheezing. Negative for cough and shortness of breath.    Cardiovascular: Positive for leg swelling. Negative for chest pain and palpitations.   Gastrointestinal: Positive for abdominal distention. Negative for abdominal pain, constipation, nausea and vomiting.   Genitourinary: Positive for decreased urine volume. Negative for dysuria.   Musculoskeletal: Negative for neck pain.   Psychiatric/Behavioral: Positive for confusion. Negative for agitation.     Objective:     Vital Signs (Most Recent):  Temp: 98 °F (36.7 °C) (02/14/17 1241)  Pulse: (!) 114 (02/14/17 1400)  Resp: 16 (02/14/17 1241)  BP: 105/68 (02/14/17 1241)  SpO2: 98 % (02/14/17 1241) Vital Signs (24h Range):  Temp:  [97.5 °F (36.4 °C)-98.8 °F (37.1 °C)] 98 °F (36.7 °C)  Pulse:  [] 114  Resp:  [15-20] 16  SpO2:  [96 %-99 %] 98 %  BP: ()/(60-84) 105/68     Weight: 81.6 kg (180 lb)  Body mass index is 27.37 kg/(m^2).    Intake/Output Summary (Last 24 hours) at 02/14/17 1615  Last data filed at 02/14/17 1132   Gross per 24 hour   Intake           929.75 ml   Output             5500 ml   Net         -4570.25 ml      Physical Exam   Constitutional: No distress.   Cachectic    HENT:   Head: Normocephalic and atraumatic.   Eyes: Scleral icterus is present.   Pale conjunctivae    Neck: No JVD present.   Cardiovascular: Normal rate, regular rhythm and normal heart sounds.    No murmur heard.  Pulmonary/Chest: Effort normal and breath sounds normal. No respiratory distress. He has no wheezes. He has no rales.   Abdominal: Bowel sounds are normal. He exhibits no distension (after paracentesis ). There is no tenderness.   Tense and distended   Musculoskeletal: He exhibits edema (+3 edema till thigh).   Neurological:   Disoriented x3  + astrexis    Skin: He is not diaphoretic.   + spider angioma        Significant Labs:   Bilirubin:   Recent Labs  Lab 02/11/17  0011  02/11/17  0744 02/12/17  0446 02/13/17  0510 02/14/17  0514   BILITOT 3.1* 3.0* 3.1* 2.5* 3.8*     CBC:   Recent Labs  Lab 02/13/17  0510 02/14/17  0514   WBC 5.97 5.27   HGB 6.8* 8.4*   HCT 19.7* 24.1*   PLT 52* 46*     CMP:   Recent Labs  Lab 02/13/17  0510 02/14/17 0514   * 137   K 3.7 3.9   CL 98 104   CO2 27 26   GLU 75 74   BUN 30* 27*   CREATININE 2.1* 1.6*   CALCIUM 10.3 9.4   PROT 6.2 5.6*   ALBUMIN 3.1* 2.6*   BILITOT 2.5* 3.8*   ALKPHOS 62 58   AST 56* 58*   ALT 20 22   ANIONGAP 9 7*   EGFRNONAA 35.1* 48.8*           Assessment/Plan:      * Decompensated hepatic cirrhosis  - from limited history most likely 2/2 EtOH cirrhosis. Does not appear to have transaminitis elevation >1000k so tylenol, acute hep, shock, Budd Chiari less likely. Also consider autoimmune, morgan's, chronic viral hepatitis, hemachromatosis  -Hep panel with hep C Ab positive. Negative hep B surface Ag  Hepatitis B Surface Ag Negative   Hep B C IgM Negative   Hep A IgM Negative   Hepatitis C Ab Positive (A)   - f/u hep c PCR  HIV ab1/2 negative.     -IgG elevated, anti smooth muscle ab negative, ceruloplasmin low  -Ferritin pending  -US Liver w/ doppler with micronodular cirrhosis   -paracentesis was done with 5 liter of fluid., waiting for ascitic fluid studies.   -UA clear, CxR without acute cardiopulmonary abnormality, BCx NGTD  -CTX 1g q24h for SBP ppx    MELD-Na score: 26 at 2/14/2017  5:14 AM  MELD score: 26 at 2/14/2017  5:14 AM  Calculated from:  Serum Creatinine: 1.6 mg/dL at 2/14/2017  5:14 AM  Serum Sodium: 137 mmol/L at 2/14/2017  5:14 AM  Total Bilirubin: 3.8 mg/dL at 2/14/2017  5:14 AM  INR(ratio): 2.5 at 2/14/2017  5:14 AM  Age: 51 years  -Hepatology on board and recommend giving albumin daily instead of q6h, not candidate for liver transplant evaluation.       Hyponatremia  -Most likely 2/2 decompensated cirrhosis, improving  - serum osm with 280 almost hypotonic range and overloaded with Yvonne <20 --> DDx: CHF or  cirrhosis   - fluid restriction and low salt diet.       Acute renal failure  -Cr. 2.0 on unknown baseline, unknown etiology possibly pre-renal vs ATN vs HRS  -Urine sodium < 20 with FENa with pre-renal picture, urine protein/creatinine ratio normal 0.11  -Retroperitoneal US normal sizes  -UA unremarkable, still oliguric.   -- his cr improved to 1.6 with midodrine 10 mg TID, octreotide 10mcq TID, and albumin 25 gm daily      Hepatic encephalopathy  -Grade II-III, ammonia elevated, likely 2/2 decompensated cirrhosis  - still alerted with +astrexis  -Lactulose TID, rifaximin  -Seizure precautions, fall precautions, aspiration precautions  -Neurochecks q4h  -Head CT was negative for acute events and utox is negative.     Hypercalcemia  - corrected Ca on admission is 12.4  - PTH is low normal --> unlikely hyperparathyroidism   - vitamin D is low -- > unlikely vitamin D toxicity or granulomatous diseases   - TSH is normal   --  PTHrP, UPEP, in process  SPEP: Decreased total protein.   Increased gamma globulin, polyclonal.   No paraprotein bands are detected; cirrhotic pattern.     Coagulopathy  -Likely 2/2 liver dz,   - vitamin K 10 mg orally for 3 days.       Thrombocytopenia, unspecified  -Likely 2/2 liver dz,       Macrocytic anemia  -Most likely due to chronic dz  -Vit B12, folate normal  - received PRBC 1 unit yesterday with good response.   - iron-deficiency, start iron supplement       Protein-calorie malnutrition, severe  -Likely 2/2 malnutrition from EtOH and liver dz  -Nutrition consulted  - on thiamine       ETOH abuse  -Reports heavy EtOH consumption, unsure of last drink, his wife mentioned frequent seizure after d/c of alcohol   -Thiamine, folate, multivitamin        Hepatitis C antibody test positive  -- ordered Hep C RNA quantitative, PCR,      Hypomagnesemia  -- replace       Hypokalemia, resolved as of 2/12/2017  -Replaced      VTE Risk Mitigation         Ordered     Medium Risk of VTE  Once       02/11/17 0702     Place sequential compression device  Until discontinued      02/11/17 0702     Place AMA hose  Until discontinued      02/11/17 0702          Ryan Guthrie MD  Department of Hospital Medicine   [facility]

## 2017-02-14 NOTE — CONSULTS
Consult placed for paracentesis.  Large volume of ascites was seen on recent ultrasound from 2/11/2017.  Paracentesis ordered received and patient is scheduled to be evaluated today for possible paracentesis.  Please re-consult if we can assist any further.    Jonnathan Jeff MD  PGY-V  Interventional Radiology

## 2017-02-15 LAB
ALBUMIN SERPL BCP-MCNC: 2.7 G/DL
ALP SERPL-CCNC: 51 U/L
ALT SERPL W/O P-5'-P-CCNC: 20 U/L
ANION GAP SERPL CALC-SCNC: 5 MMOL/L
AST SERPL-CCNC: 56 U/L
BASOPHILS # BLD AUTO: 0.03 K/UL
BASOPHILS NFR BLD: 0.5 %
BILIRUB SERPL-MCNC: 5.1 MG/DL
BUN SERPL-MCNC: 17 MG/DL
CALCIUM SERPL-MCNC: 9.1 MG/DL
CHLORIDE SERPL-SCNC: 107 MMOL/L
CO2 SERPL-SCNC: 29 MMOL/L
CREAT SERPL-MCNC: 1.2 MG/DL
DIFFERENTIAL METHOD: ABNORMAL
EOSINOPHIL # BLD AUTO: 0.1 K/UL
EOSINOPHIL NFR BLD: 1.9 %
ERYTHROCYTE [DISTWIDTH] IN BLOOD BY AUTOMATED COUNT: ABNORMAL %
EST. GFR  (AFRICAN AMERICAN): >60 ML/MIN/1.73 M^2
EST. GFR  (NON AFRICAN AMERICAN): >60 ML/MIN/1.73 M^2
GLUCOSE SERPL-MCNC: 99 MG/DL
HCT VFR BLD AUTO: 24.7 %
HCV LOG: 4.11 LOG (10) IU/ML
HCV RNA QUANT PCR: ABNORMAL IU/ML
HCV, QUALITATIVE: DETECTED IU/ML
HGB BLD-MCNC: 8.4 G/DL
HYPOCHROMIA BLD QL SMEAR: ABNORMAL
INR PPP: 2.5
LYMPHOCYTES # BLD AUTO: 1.4 K/UL
LYMPHOCYTES NFR BLD: 22.6 %
MAGNESIUM SERPL-MCNC: 1.3 MG/DL
MCH RBC QN AUTO: 35.7 PG
MCHC RBC AUTO-ENTMCNC: 34 %
MCV RBC AUTO: 105 FL
MONOCYTES # BLD AUTO: 0.6 K/UL
MONOCYTES NFR BLD: 9 %
NEUTROPHILS # BLD AUTO: 4.1 K/UL
NEUTROPHILS NFR BLD: 65.4 %
OVALOCYTES BLD QL SMEAR: ABNORMAL
PHOSPHATE SERPL-MCNC: 2.6 MG/DL
PLATELET # BLD AUTO: 42 K/UL
PLATELET BLD QL SMEAR: ABNORMAL
PMV BLD AUTO: 10.5 FL
POCT GLUCOSE: 108 MG/DL (ref 70–110)
POCT GLUCOSE: 113 MG/DL (ref 70–110)
POCT GLUCOSE: 118 MG/DL (ref 70–110)
POCT GLUCOSE: 165 MG/DL (ref 70–110)
POCT GLUCOSE: 184 MG/DL (ref 70–110)
POCT GLUCOSE: 365 MG/DL (ref 70–110)
POIKILOCYTOSIS BLD QL SMEAR: SLIGHT
POLYCHROMASIA BLD QL SMEAR: ABNORMAL
POTASSIUM SERPL-SCNC: 3.6 MMOL/L
PROT SERPL-MCNC: 5.5 G/DL
PROTHROMBIN TIME: 25.1 SEC
PTH RELATED PROT SERPL-SCNC: <0.2 PMOL/L
RBC # BLD AUTO: 2.35 M/UL
SODIUM SERPL-SCNC: 141 MMOL/L
WBC # BLD AUTO: 6.2 K/UL

## 2017-02-15 PROCEDURE — 20600001 HC STEP DOWN PRIVATE ROOM

## 2017-02-15 PROCEDURE — 25000003 PHARM REV CODE 250: Performed by: ANESTHESIOLOGY

## 2017-02-15 PROCEDURE — 25000003 PHARM REV CODE 250: Performed by: STUDENT IN AN ORGANIZED HEALTH CARE EDUCATION/TRAINING PROGRAM

## 2017-02-15 PROCEDURE — 25000003 PHARM REV CODE 250: Performed by: INTERNAL MEDICINE

## 2017-02-15 PROCEDURE — 63600175 PHARM REV CODE 636 W HCPCS: Performed by: INTERNAL MEDICINE

## 2017-02-15 PROCEDURE — 99233 SBSQ HOSP IP/OBS HIGH 50: CPT | Mod: ,,, | Performed by: INTERNAL MEDICINE

## 2017-02-15 PROCEDURE — 63600175 PHARM REV CODE 636 W HCPCS: Performed by: HOSPITALIST

## 2017-02-15 PROCEDURE — 99232 SBSQ HOSP IP/OBS MODERATE 35: CPT | Mod: ,,, | Performed by: INTERNAL MEDICINE

## 2017-02-15 PROCEDURE — P9047 ALBUMIN (HUMAN), 25%, 50ML: HCPCS | Performed by: INTERNAL MEDICINE

## 2017-02-15 PROCEDURE — 85610 PROTHROMBIN TIME: CPT

## 2017-02-15 PROCEDURE — 85025 COMPLETE CBC W/AUTO DIFF WBC: CPT

## 2017-02-15 PROCEDURE — 84100 ASSAY OF PHOSPHORUS: CPT

## 2017-02-15 PROCEDURE — 83735 ASSAY OF MAGNESIUM: CPT

## 2017-02-15 PROCEDURE — 36415 COLL VENOUS BLD VENIPUNCTURE: CPT

## 2017-02-15 PROCEDURE — 63600175 PHARM REV CODE 636 W HCPCS: Performed by: STUDENT IN AN ORGANIZED HEALTH CARE EDUCATION/TRAINING PROGRAM

## 2017-02-15 PROCEDURE — 80053 COMPREHEN METABOLIC PANEL: CPT

## 2017-02-15 PROCEDURE — 25000003 PHARM REV CODE 250: Performed by: HOSPITALIST

## 2017-02-15 RX ORDER — ERGOCALCIFEROL 1.25 MG/1
50000 CAPSULE ORAL
Status: DISCONTINUED | OUTPATIENT
Start: 2017-02-22 | End: 2017-02-18 | Stop reason: HOSPADM

## 2017-02-15 RX ORDER — ERGOCALCIFEROL 1.25 MG/1
50000 CAPSULE ORAL
Status: DISCONTINUED | OUTPATIENT
Start: 2017-02-15 | End: 2017-02-15

## 2017-02-15 RX ORDER — LACTULOSE 10 G/15ML
20 SOLUTION ORAL 3 TIMES DAILY
Status: DISCONTINUED | OUTPATIENT
Start: 2017-02-15 | End: 2017-02-15

## 2017-02-15 RX ORDER — LACTULOSE 10 G/15ML
20 SOLUTION ORAL 2 TIMES DAILY
Status: DISCONTINUED | OUTPATIENT
Start: 2017-02-15 | End: 2017-02-18 | Stop reason: HOSPADM

## 2017-02-15 RX ADMIN — POTASSIUM & SODIUM PHOSPHATES POWDER PACK 280-160-250 MG 1 PACKET: 280-160-250 PACK at 12:02

## 2017-02-15 RX ADMIN — MIDODRINE HYDROCHLORIDE 10 MG: 2.5 TABLET ORAL at 05:02

## 2017-02-15 RX ADMIN — MIDODRINE HYDROCHLORIDE 10 MG: 2.5 TABLET ORAL at 12:02

## 2017-02-15 RX ADMIN — RIFAXIMIN 550 MG: 550 TABLET ORAL at 08:02

## 2017-02-15 RX ADMIN — MIDODRINE HYDROCHLORIDE 10 MG: 2.5 TABLET ORAL at 08:02

## 2017-02-15 RX ADMIN — PHYTONADIONE 10 MG: 5 TABLET ORAL at 08:02

## 2017-02-15 RX ADMIN — LACTULOSE 30 G: 10 SOLUTION ORAL at 05:02

## 2017-02-15 RX ADMIN — THERA TABS 1 TABLET: TAB at 08:02

## 2017-02-15 RX ADMIN — LACTULOSE 20 G: 10 SOLUTION ORAL at 08:02

## 2017-02-15 RX ADMIN — POTASSIUM & SODIUM PHOSPHATES POWDER PACK 280-160-250 MG 1 PACKET: 280-160-250 PACK at 08:02

## 2017-02-15 RX ADMIN — OCTREOTIDE ACETATE 100 MCG: 100 INJECTION, SOLUTION INTRAVENOUS; SUBCUTANEOUS at 10:02

## 2017-02-15 RX ADMIN — OCTREOTIDE ACETATE 100 MCG: 100 INJECTION, SOLUTION INTRAVENOUS; SUBCUTANEOUS at 01:02

## 2017-02-15 RX ADMIN — ALBUMIN (HUMAN) 25 G: 12.5 SOLUTION INTRAVENOUS at 08:02

## 2017-02-15 RX ADMIN — PANTOPRAZOLE SODIUM 40 MG: 40 TABLET, DELAYED RELEASE ORAL at 08:02

## 2017-02-15 RX ADMIN — THIAMINE HYDROCHLORIDE 100 MG: 100 INJECTION, SOLUTION INTRAMUSCULAR; INTRAVENOUS at 03:02

## 2017-02-15 RX ADMIN — CEFTRIAXONE 1 G: 1 INJECTION, SOLUTION INTRAVENOUS at 04:02

## 2017-02-15 RX ADMIN — SODIUM CHLORIDE 500 ML: 0.9 INJECTION, SOLUTION INTRAVENOUS at 03:02

## 2017-02-15 RX ADMIN — ERGOCALCIFEROL 50000 UNITS: 1.25 CAPSULE ORAL at 08:02

## 2017-02-15 RX ADMIN — OCTREOTIDE ACETATE 100 MCG: 100 INJECTION, SOLUTION INTRAVENOUS; SUBCUTANEOUS at 05:02

## 2017-02-15 RX ADMIN — FOLIC ACID 1 MG: 1 TABLET ORAL at 08:02

## 2017-02-15 NOTE — NURSING
MD called, pt's HR still 150-160s. MD ordered a 500 cc bolus and to take vitals after infusion and call back with status. Pt in no distress and asymptomatic at this time.

## 2017-02-15 NOTE — PLAN OF CARE
Problem: Fall Risk (Adult)  Intervention: Patient Rounds  Pt free from falls overnight. Pt's bed alarm on and restraints are in place. Wife continued to take pt's restraints off last night and security was called to reinforce the importance of keeping the restraints on for pt safety.     02/15/17 0634   Safety Interventions   Patient Rounds bed in low position;toileting offered;visualized patient;clutter free environment maintained;bed wheels locked;ID band on;call light in reach;placement of personal items at bedside           Problem: Patient Care Overview  Goal: Plan of Care Review  Outcome: Ongoing (interventions implemented as appropriate)  Pt disoriented x 4, wife at bedside but confused as well. Pt's HR jumped up to 160 overnight. Pt was administered a 500 cc bolus of NS. Pt BG checked q6h, staying in the low 100s. Pt was given IV antibiotics overnight. Pt taking pills crushed and in pudding. Pt's feet are very swollen and pt has a watts ordered by urology. Pt's 24 hr urine was concluded at 2000 last night. Pt on a 2 g low na diet. Pt in restraints due to pulling out medical tubing. Pt very confused overnight.     Problem: Restraint, Nonbehavioral (nonviolent)  Intervention: Restraint Injuries Monitor Q2 hours  Pt had no skin breakdown from the restraints overnight. Pt free from injury from restraints overnight.     02/15/17 0634   Restraint Injuries - Monitor Q2H   Injuries Noted  None

## 2017-02-15 NOTE — NURSING
RN informed by HELEN Sousa that upon her rounds to come and feed Mr. Parsons she found the pt to be out of his bilateral wrist restraints. RN educated wife again on the importance of keeping the restraints in place until the doctor discontinues the orders. Wife very difficult to educate. Wife just continues to repeat she needs to know how she is going to get home and concerned that pt's JESUS is 2/17/17 and that she does not know the plan. Reiterated to wife that  and case management are actively seeking placement for Mr. Parsons and that as soon as nursing is made aware of the plans that we would inform her and Mr. Parsons. Will continue to monitor patient closely.

## 2017-02-15 NOTE — PLAN OF CARE
Problem: Restraint, Nonbehavioral (nonviolent)  Goal: Discontinuation Criteria Achieved  Outcome: Ongoing (interventions implemented as appropriate)  Restraints removed, Avasys camera place at bedside to monitor for pt attempting to remove lines. Explained to patient and wife the purpose of the camera. Will notify MD of any changes

## 2017-02-15 NOTE — NURSING
Pg team regarding pt's HR sustaining at 160 for the past 5 minutes. Pt asymptomatic at this time. Will monitor for the next couple of minutes and calm pt down. Will continue to monitor.

## 2017-02-15 NOTE — PROGRESS NOTES
Progress Note  Hepatology    Admit Date: 2/10/2017   LOS: 4 days     SUBJECTIVE:     Follow-up For: Decompensated cirrhosis - transplant evaluation    HPI: Ricky Parsons is a 51 y.o. gentleman with PMH of CVA , wheelchair bound, EtOH abuse who [place] [facility] [place] [facility] for hepatology evaluation. Per report pt presented to OSH after a fall from chair and generalized weakness. His wife reports that he has a history of seizures but she cannot recall last seizure. They live in [place]. The patient has [place] medicaid.     Interval history: pt is more awake today, sitting in chair and eating breakfast . Wife is at bedside, of note she also has a history of recent stroke and providing history by her might also be influenced per chart review from other providers. No problems overnight. Pt was transfused 1 U PRBC and ordered vit K, denies melena or hematochezia  2:15- overnight pt had episode of tachycardia in 150s-160s with hypotension that seems to have responded to 500 CC IIVF, pt epic pt had 8 BMs    Scheduled Meds:   albumin human 25%  25 g Intravenous Daily    cefTRIAXone (ROCEPHIN) IVPB  1 g Intravenous Q24H    [START ON 2/22/2017] ergocalciferol  50,000 Units Oral Q7 Days    ferric gluconate (FERRLECIT) IVPB  125 mg Intravenous Every Tues, Thurs, Sat    folic acid  1 mg Oral Daily    lactulose  20 g Oral BID    midodrine  10 mg Oral TID WM    multivitamin  1 tablet Oral Daily    octreotide  100 mcg Subcutaneous Q8H    pantoprazole  40 mg Oral Daily    rifAXIMimin  550 mg Oral BID    thiamine (VITAMIN B1) IVPB  100 mg Intravenous Q24H     Continuous Infusions:   PRN Meds:dextrose 50%, dextrose 50%, glucagon (human recombinant), glucose, glucose, lorazepam, ondansetron    OBJECTIVE:     Physical Exam:  Vital Signs (Most Recent)  Temp: 98.1 °F (36.7 °C) (02/15/17 1533)  Pulse: 105 (02/15/17 1533)  Resp: 18 (02/15/17 1533)  BP: (!) 139/92 (02/15/17 1533)  SpO2: 97 %  (02/15/17 1533)    Temperature Range Min/Max (Last 24H):  Temp:  [98 °F (36.7 °C)-98.9 °F (37.2 °C)]     General appearance: alert, appears stated age and cooperative.  Eyes: negative findings: conjunctivae and scleral icterus.   Throat: lips, mucosa, and tongue normal.  Neck, no JVD, trachea midline  Abdomen: soft, non-tender; bowel sounds normal; no masses, distended  Skin: No rashes or lesions to exposed areas; jaundice appreciated.  Ext, LE edema +1    Laboratory:    Recent Labs  Lab 02/13/17  0510 02/14/17  0514 02/15/17  0602   * 137 141   K 3.7 3.9 3.6   CL 98 104 107   CO2 27 26 29   BUN 30* 27* 17   CREATININE 2.1* 1.6* 1.2   CALCIUM 10.3 9.4 9.1   PROT 6.2 5.6* 5.5*   BILITOT 2.5* 3.8* 5.1*   ALKPHOS 62 58 51*   ALT 20 22 20   AST 56* 58* 56*         Recent Labs  Lab 02/13/17  0510 02/14/17  0514 02/15/17  0602   WBC 5.97 5.27 6.20   HGB 6.8* 8.4* 8.4*   HCT 19.7* 24.1* 24.7*   PLT 52* 46* 42*         Recent Labs  Lab 02/13/17  0510 02/14/17  0514 02/15/17  0602   INR 2.7* 2.5* 2.5*       MELD-Na score: 25 at 2/15/2017  6:02 AM  MELD score: 25 at 2/15/2017  6:02 AM  Calculated from:  Serum Creatinine: 1.2 mg/dL at 2/15/2017  6:02 AM  Serum Sodium: 141 mmol/L (Rounded to 137) at 2/15/2017  6:02 AM  Total Bilirubin: 5.1 mg/dL at 2/15/2017  6:02 AM  INR(ratio): 2.5 at 2/15/2017  6:02 AM  Age: 51 years    Assessment:  Ricky Parsons is a 51 y.o. gentleman with PMH of CVA , wheelchair bound, EtOH abuse and reported seizure history who presents to [facility] as a transfer from [facility] for hepatology evaluation.    1. Decompensated (suspected) alcohol cirrhosis -  His liver disease is complicated by:  · Hepatic encephalopathy  · Ascites      Plan:  1. Patient is not a transplant candidate given insurance - [place] medicaid, ongoing substance abuse issues, poor insight into disease.   2. Para did not reveal infection although samples were drawn while pt had already received abx. Pt completed 5 days  of ceftriaxone, please DC any remaining rocephin orders   3. Continue lactulose and titrate to 3-4 BM's per day pt reported to have 8 lastnight.   4. Offered an EGD to pt for further evaluation of anemia and need for assessment of esophageal varices , pt  refused   5. In this pt with elevated bili, PO vitamin K is not effective consider IV vit K for reversing coagulopathies   6. Consider sustaining  hepatorenal treatment for one more day      Candie Lopez MD PGY2      Staff attestation to follow

## 2017-02-15 NOTE — PLAN OF CARE
Called [facility] & spoke to Mana. Does not accept patients insurance    Called Gerardo left message    Called [facility] spoke to Delilah. Received referral & will F/U with Nannette

## 2017-02-15 NOTE — PROGRESS NOTES
Progress Note  Nephrology    Admit Date: 2/10/2017   LOS: 4 days     SUBJECTIVE:     Follow-up For: STEPHANIE, hyponatremia    Interval follow up: NAEON. Patient alert this morning with wife bedside. Had paracentesis done yesterday with 5L removed. Had some episodes of tachycardia overnight and received 500 cc bolus. Urine output has remained stable (1.3L)     OBJECTIVE:     Vital Signs (Most Recent)  Temp: 98.3 °F (36.8 °C) (02/15/17 0812)  Pulse: 110 (02/15/17 0812)  Resp: 18 (02/15/17 0812)  BP: 106/77 (02/15/17 0812)  SpO2: 98 % (02/15/17 0812)    Vital Signs Range (Last 24H):  Temp:  [97.5 °F (36.4 °C)-98.9 °F (37.2 °C)]   Pulse:  []   Resp:  [14-18]   BP: ()/(62-85)   SpO2:  [94 %-99 %]        Physical Exam:   General: well developed, appears older than stated age, cachectic, icteric, no distress, l  HENT: Head:normocephalic, atraumatic.   Nose: Nares normal. Septum midline. Mucosa normal. No drainage or sinus tenderness., no discharge. Throat: lips, mucosa, and tongue normal; teeth and gums normal and no throat erythema.  Eyes: conjunctivae/corneas clear. PERRL.   Neck: no adenopathy, no carotid bruit, supple, symmetrical, trachea midline, no JVD and thyroid not enlarged, symmetric, no tenderness/mass/nodules  Lungs: clear to auscultation bilaterally, diminished breath sounds bilaterally and no wheeze, rales or rhonchi bilaterlly  Cardiovascular: Heart: regular rate and rhythm, S1, S2 normal, no murmur, click, rub or gallop. Chest Wall: no tenderness. Extremities: edema bialteral + 4 and sacral edema, no cyanosis and unable to palpate DP with edema. Pulses: 2+ and symmetric  not well palpated.  Abdomen/Rectal: Abdomen: distended, tense nontender, uanble to palpate liver, spleen, decreased bowel sounds. Rectal: normal tone, no masses or tenderness and not examined  Skin: no evidence of bleeding or bruising, no lesions noted, temperature normal, texture normal and spider angioma or k  Musculoskeletal:no  clubbing, cyanosis and unable to test movement or strength due to disorientation    Laboratory:  CBC:     Recent Labs  Lab 02/14/17  0514 02/15/17  0602   WBC 5.27 6.20   RBC 2.34* 2.35*   HGB 8.4* 8.4*   HCT 24.1* 24.7*   PLT 46*  --    * 105*   MCH 35.9* 35.7*   MCHC 34.9 34.0     CMP:     Recent Labs  Lab 02/15/17  0602   GLU 99   CALCIUM 9.1   ALBUMIN 2.7*   PROT 5.5*      K 3.6   CO2 29      BUN 17   CREATININE 1.2   ALKPHOS 51*   ALT 20   AST 56*   BILITOT 5.1*       Diagnostic Results:  Labs: Reviewed  X-Ray: Reviewed    ASSESSMENT/PLAN:     Active Hospital Problems    Diagnosis  POA    *Decompensated hepatic cirrhosis [K72.90]  Yes    Hypomagnesemia [E83.42]  Yes    Hepatitis C antibody test positive [R76.8]  Yes    Moderate protein-calorie malnutrition [E44.0]  Yes    Hyponatremia [E87.1]  Yes    Acute renal failure [N17.9]  Yes    Hepatic encephalopathy [K72.90]  Yes    Hypercalcemia [E83.52]  Yes    Coagulopathy [D68.9]  Yes    Thrombocytopenia, unspecified [D69.6]  Yes    Macrocytic anemia [D53.9]  Yes    History of rapid strep test [Z92.89]  Not Applicable    Protein-calorie malnutrition, severe [E43]  Yes    ETOH abuse [F10.10]  Yes    Vitamin D insufficiency [E55.9]  Yes      Resolved Hospital Problems    Diagnosis Date Resolved POA    Hypokalemia [E87.6] 02/12/2017 Yes     52 yo wm with h/o ETOH abuse, CVA, found to be encephalopathic,icteric hyponatremic with tense ascites and serum creatinine of 2.0, unknown baseline, minimal proteinuria, lactic acid 3.3, normal imaging of kidneys.     STEPHANIE on unknown baseline likely pre- renal vs HRS  - Patient creatinine back to what I believe is his baseline levels   - Urine output has remained stable   - No major acidosis or electrolyte imbalances on laboratories   - 24 hour urine collection for protein 124  - Discontinue Octreotide and Continue on Midodrine to maintain adequate BP levels   - Will sign off case. Please call if  needed. Patient will need follow up in outpatient clinics once discharge.     Anemia of Chronic Disease   - Will give Ferlicit x 6 doses   - Further management could e done as outpatient once patient completes iron infusion  - Consider evaluation for GI bleeding    Mineral Bone Disease in CKD   - Continue on ergocalciferol 50,000 units weekly x 8 weeks     Nito Ordoñez   Nephrology fellow PGY- 5  [phone]

## 2017-02-15 NOTE — PLAN OF CARE
REFERRAL SENT TO     [facility]  [facility]  [facility] [phone]  [facility] [phone]  [facility]ELA WILDER [phone]  [facility] [phone]  [facility] [phone]  [facility] [phone]  [facility] [phone]  [facility] [phone]    LYSSA IS F/U.

## 2017-02-15 NOTE — NURSING
MD notified pt attempted to remove catheter, iv and was trying to climb out of bed; restraints started per order; will continue to monitor

## 2017-02-15 NOTE — PLAN OF CARE
[facility] has no Medicaid beds available.    Nannette Quintero, [facility]  [id number]

## 2017-02-16 ENCOUNTER — SURGERY (OUTPATIENT)
Age: 52
End: 2017-02-16

## 2017-02-16 PROBLEM — B18.2 CHRONIC HEPATITIS C WITH CIRRHOSIS: Status: ACTIVE | Noted: 2017-02-16

## 2017-02-16 PROBLEM — K74.60 CHRONIC HEPATITIS C WITH CIRRHOSIS: Status: ACTIVE | Noted: 2017-02-16

## 2017-02-16 PROBLEM — E83.52 HYPERCALCEMIA: Status: RESOLVED | Noted: 2017-02-11 | Resolved: 2017-02-16

## 2017-02-16 PROBLEM — E87.1 HYPONATREMIA: Status: RESOLVED | Noted: 2017-02-11 | Resolved: 2017-02-16

## 2017-02-16 PROBLEM — I48.91 ATRIAL FIBRILLATION: Status: ACTIVE | Noted: 2017-02-16

## 2017-02-16 PROBLEM — N17.9 ACUTE RENAL FAILURE: Status: RESOLVED | Noted: 2017-02-11 | Resolved: 2017-02-16

## 2017-02-16 LAB
ALBUMIN SERPL BCP-MCNC: 2.5 G/DL
ALP SERPL-CCNC: 47 U/L
ALT SERPL W/O P-5'-P-CCNC: 22 U/L
ANION GAP SERPL CALC-SCNC: 6 MMOL/L
ANISOCYTOSIS BLD QL SMEAR: SLIGHT
AST SERPL-CCNC: 57 U/L
BACTERIA #/AREA URNS AUTO: ABNORMAL /HPF
BACTERIA BLD CULT: NORMAL
BACTERIA BLD CULT: NORMAL
BASOPHILS # BLD AUTO: 0.03 K/UL
BASOPHILS NFR BLD: 0.5 %
BILIRUB SERPL-MCNC: 6 MG/DL
BILIRUB UR QL STRIP: ABNORMAL
BUN SERPL-MCNC: 10 MG/DL
CALCIUM SERPL-MCNC: 8.2 MG/DL
CHLORIDE SERPL-SCNC: 107 MMOL/L
CLARITY UR REFRACT.AUTO: CLEAR
CO2 SERPL-SCNC: 27 MMOL/L
COLOR UR AUTO: ABNORMAL
CREAT SERPL-MCNC: 0.8 MG/DL
DIFFERENTIAL METHOD: ABNORMAL
EOSINOPHIL # BLD AUTO: 0.2 K/UL
EOSINOPHIL NFR BLD: 2.8 %
ERYTHROCYTE [DISTWIDTH] IN BLOOD BY AUTOMATED COUNT: 24.4 %
EST. GFR  (AFRICAN AMERICAN): >60 ML/MIN/1.73 M^2
EST. GFR  (NON AFRICAN AMERICAN): >60 ML/MIN/1.73 M^2
GLUCOSE SERPL-MCNC: 91 MG/DL
GLUCOSE UR QL STRIP: NEGATIVE
HCT VFR BLD AUTO: 23.6 %
HGB BLD-MCNC: 8.1 G/DL
HGB UR QL STRIP: ABNORMAL
HYALINE CASTS UR QL AUTO: 5 /LPF
HYPOCHROMIA BLD QL SMEAR: ABNORMAL
INR PPP: 2.7
KETONES UR QL STRIP: NEGATIVE
LEUKOCYTE ESTERASE UR QL STRIP: NEGATIVE
LYMPHOCYTES # BLD AUTO: 1.4 K/UL
LYMPHOCYTES NFR BLD: 24.8 %
MAGNESIUM SERPL-MCNC: 1 MG/DL
MCH RBC QN AUTO: 35.8 PG
MCHC RBC AUTO-ENTMCNC: 34.3 %
MCV RBC AUTO: 104 FL
MICROSCOPIC COMMENT: ABNORMAL
MONOCYTES # BLD AUTO: 0.5 K/UL
MONOCYTES NFR BLD: 8.8 %
NEUTROPHILS # BLD AUTO: 3.6 K/UL
NEUTROPHILS NFR BLD: 63.1 %
NITRITE UR QL STRIP: NEGATIVE
OVALOCYTES BLD QL SMEAR: ABNORMAL
PATHOLOGIST INTERPRETATION UPE: NORMAL
PH UR STRIP: 7 [PH] (ref 5–8)
PHOSPHATE SERPL-MCNC: 2.2 MG/DL
PLATELET # BLD AUTO: 35 K/UL
PLATELET BLD QL SMEAR: ABNORMAL
PMV BLD AUTO: 10.8 FL
POCT GLUCOSE: 133 MG/DL (ref 70–110)
POCT GLUCOSE: 136 MG/DL (ref 70–110)
POCT GLUCOSE: 207 MG/DL (ref 70–110)
POCT GLUCOSE: 91 MG/DL (ref 70–110)
POIKILOCYTOSIS BLD QL SMEAR: SLIGHT
POLYCHROMASIA BLD QL SMEAR: ABNORMAL
POTASSIUM SERPL-SCNC: 3.6 MMOL/L
PROT PATTERN UR ELPH-IMP: NORMAL
PROT SERPL-MCNC: 5.1 G/DL
PROT UR QL STRIP: NEGATIVE
PROTHROMBIN TIME: 27.4 SEC
RBC # BLD AUTO: 2.26 M/UL
RBC #/AREA URNS AUTO: 13 /HPF (ref 0–4)
SODIUM SERPL-SCNC: 140 MMOL/L
SP GR UR STRIP: 1.01 (ref 1–1.03)
SQUAMOUS #/AREA URNS AUTO: 1 /HPF
URN SPEC COLLECT METH UR: ABNORMAL
UROBILINOGEN UR STRIP-ACNC: NEGATIVE EU/DL
WBC # BLD AUTO: 5.81 K/UL
WBC #/AREA URNS AUTO: 11 /HPF (ref 0–5)

## 2017-02-16 PROCEDURE — 80053 COMPREHEN METABOLIC PANEL: CPT

## 2017-02-16 PROCEDURE — 25000003 PHARM REV CODE 250: Performed by: INTERNAL MEDICINE

## 2017-02-16 PROCEDURE — 63600175 PHARM REV CODE 636 W HCPCS: Performed by: HOSPITALIST

## 2017-02-16 PROCEDURE — 85025 COMPLETE CBC W/AUTO DIFF WBC: CPT

## 2017-02-16 PROCEDURE — 25000003 PHARM REV CODE 250: Performed by: STUDENT IN AN ORGANIZED HEALTH CARE EDUCATION/TRAINING PROGRAM

## 2017-02-16 PROCEDURE — 25000003 PHARM REV CODE 250: Performed by: HOSPITALIST

## 2017-02-16 PROCEDURE — 63600175 PHARM REV CODE 636 W HCPCS: Performed by: INTERNAL MEDICINE

## 2017-02-16 PROCEDURE — 99233 SBSQ HOSP IP/OBS HIGH 50: CPT | Mod: ,,, | Performed by: INTERNAL MEDICINE

## 2017-02-16 PROCEDURE — 93010 ELECTROCARDIOGRAM REPORT: CPT | Mod: ,,, | Performed by: INTERNAL MEDICINE

## 2017-02-16 PROCEDURE — 87086 URINE CULTURE/COLONY COUNT: CPT

## 2017-02-16 PROCEDURE — 85610 PROTHROMBIN TIME: CPT

## 2017-02-16 PROCEDURE — 93005 ELECTROCARDIOGRAM TRACING: CPT

## 2017-02-16 PROCEDURE — 36415 COLL VENOUS BLD VENIPUNCTURE: CPT

## 2017-02-16 PROCEDURE — 81001 URINALYSIS AUTO W/SCOPE: CPT

## 2017-02-16 PROCEDURE — 84100 ASSAY OF PHOSPHORUS: CPT

## 2017-02-16 PROCEDURE — P9047 ALBUMIN (HUMAN), 25%, 50ML: HCPCS | Performed by: INTERNAL MEDICINE

## 2017-02-16 PROCEDURE — 63600175 PHARM REV CODE 636 W HCPCS: Performed by: STUDENT IN AN ORGANIZED HEALTH CARE EDUCATION/TRAINING PROGRAM

## 2017-02-16 PROCEDURE — 99232 SBSQ HOSP IP/OBS MODERATE 35: CPT | Mod: ,,, | Performed by: INTERNAL MEDICINE

## 2017-02-16 PROCEDURE — 20600001 HC STEP DOWN PRIVATE ROOM

## 2017-02-16 PROCEDURE — 83735 ASSAY OF MAGNESIUM: CPT

## 2017-02-16 RX ORDER — METOPROLOL TARTRATE 25 MG/1
12.5 TABLET ORAL 2 TIMES DAILY
Status: DISCONTINUED | OUTPATIENT
Start: 2017-02-16 | End: 2017-02-18 | Stop reason: HOSPADM

## 2017-02-16 RX ORDER — IBUPROFEN 200 MG
400 TABLET ORAL ONCE
Status: COMPLETED | OUTPATIENT
Start: 2017-02-16 | End: 2017-02-16

## 2017-02-16 RX ORDER — MAGNESIUM SULFATE HEPTAHYDRATE 40 MG/ML
2 INJECTION, SOLUTION INTRAVENOUS ONCE
Status: COMPLETED | OUTPATIENT
Start: 2017-02-16 | End: 2017-02-16

## 2017-02-16 RX ORDER — IBUPROFEN 200 MG
400 TABLET ORAL ONCE
Status: DISCONTINUED | OUTPATIENT
Start: 2017-02-16 | End: 2017-02-16

## 2017-02-16 RX ADMIN — THERA TABS 1 TABLET: TAB at 10:02

## 2017-02-16 RX ADMIN — Medication 12.5 MG: at 08:02

## 2017-02-16 RX ADMIN — ALBUMIN (HUMAN) 25 G: 12.5 SOLUTION INTRAVENOUS at 07:02

## 2017-02-16 RX ADMIN — FOLIC ACID 1 MG: 1 TABLET ORAL at 10:02

## 2017-02-16 RX ADMIN — IBUPROFEN 400 MG: 200 TABLET, FILM COATED ORAL at 02:02

## 2017-02-16 RX ADMIN — OCTREOTIDE ACETATE 100 MCG: 100 INJECTION, SOLUTION INTRAVENOUS; SUBCUTANEOUS at 02:02

## 2017-02-16 RX ADMIN — LACTULOSE 20 G: 10 SOLUTION ORAL at 08:02

## 2017-02-16 RX ADMIN — THIAMINE HYDROCHLORIDE 100 MG: 100 INJECTION, SOLUTION INTRAMUSCULAR; INTRAVENOUS at 03:02

## 2017-02-16 RX ADMIN — PANTOPRAZOLE SODIUM 40 MG: 40 TABLET, DELAYED RELEASE ORAL at 10:02

## 2017-02-16 RX ADMIN — MAGNESIUM SULFATE IN WATER 2 G: 40 INJECTION, SOLUTION INTRAVENOUS at 02:02

## 2017-02-16 RX ADMIN — RIFAXIMIN 550 MG: 550 TABLET ORAL at 08:02

## 2017-02-16 RX ADMIN — SODIUM FERRIC GLUCONATE COMPLEX 125 MG: 12.5 INJECTION INTRAVENOUS at 10:02

## 2017-02-16 RX ADMIN — RIFAXIMIN 550 MG: 550 TABLET ORAL at 10:02

## 2017-02-16 NOTE — NURSING
Pt struggling with NPO status compliance. Explained reasoning & risks. Gave mouth swabs moistened with water. Pt set off [employer] camera alarm 0610, found pt sitting on the edge of the bed with thick white cream on face. Pt stated he was so thirsty & had eaten a clump of lotion. RN cleaned mouth out with wet towel. Pt put back in bed. Environment cleared of objects pt could put in mouth. Bed alarm set. Will continue to monitor.

## 2017-02-16 NOTE — PROGRESS NOTES
[facility] Hospital Medicine  Progress Note    Patient Name: Ricky Parsons  MRN: [id number]  Patient Class: IP- Inpatient   Admission Date: 2/10/2017  Length of Stay: 4 days  Attending Physician: Daniela Whiteside MD  Primary Care Provider: Provider [facility] Medicine Team: [facility] HOSP MED 4 Robin Snáchez MD    Subjective:     Principal Problem:Decompensated hepatic cirrhosis    HPI:  Mr. Parsons is a 50 yo male no reported PMH per pt, per OSH records PMH CVA and wheelchair bound who presents as a transfer from [facility] per documentation there after a fall from chair and generalized weakness. Was found to be disoriented with abdominal distension and ascites so transferred to [facility] for liver evaluation. Found to be hyponatremic with a lactic acidosis to 3.3 there but hemodynamically stable. On arrival noted to have stable vital signs and evaluated by ICU. He reported then being cold and having heartburn but currently has no complaints and is significantly drowsy. Denies taking any medications at home. Consumes about 6 beers/day but does not know last drink. Per OSH ED notes was seeing bugs on sheets.     Notable ED studies from [place], ABG 7.57/79.1/29.5. Rapid strep positive, troponin negative, EtOH negative, CPK 80. UDS negative. Attempted to contact Ms. Sher (listed in demographics as emergency contact) and number disconnected. No number for his spouse is provided in transfer documentation.        Hospital Course:  Patient started on lactulose and rifaximin for HE and on rocephin empirically, seen by nephrology and recommend midodrine, albumin and octreotide to improve his MAP and his STEPHANIE, hepatology is on board for liver eval and recommend diagnostic paracentesis, his H/H dropped , consents were taken for paracentesis and PRBC    2/15 Hepatology has determined patient is not a candidate for liver transplant evaluation due insufficient health insurance,  ongoing substance abuse issues, poor insight into disease. Paracentesis performed with findings of high SAAG (>1.1) and few PMNs (10).       Interval History: NAEON. Patient remains clinically stable. Less agitated today. Mechanical restraints removed.    Review of Systems   Constitutional: Negative for chills and fever.   Respiratory: Negative for cough, shortness of breath and wheezing.    Cardiovascular: Positive for leg swelling. Negative for chest pain and palpitations.   Gastrointestinal: Positive for abdominal distention. Negative for abdominal pain, constipation, nausea and vomiting.   Genitourinary: Positive for decreased urine volume. Negative for dysuria.   Musculoskeletal: Negative for neck pain.   Psychiatric/Behavioral: Positive for confusion. Negative for agitation.     Objective:     Vital Signs (Most Recent):  Temp: 98.6 °F (37 °C) (02/15/17 2010)  Pulse: (!) 116 (02/15/17 2100)  Resp: 16 (02/15/17 2010)  BP: 106/66 (02/15/17 2010)  SpO2: 96 % (02/15/17 2010) Vital Signs (24h Range):  Temp:  [98 °F (36.7 °C)-98.7 °F (37.1 °C)] 98.6 °F (37 °C)  Pulse:  [104-166] 116  Resp:  [14-18] 16  SpO2:  [94 %-98 %] 96 %  BP: ()/(62-92) 106/66     Weight: 81.6 kg (180 lb)  Body mass index is 27.37 kg/(m^2).    Intake/Output Summary (Last 24 hours) at 02/15/17 2213  Last data filed at 02/15/17 1600   Gross per 24 hour   Intake              960 ml   Output              500 ml   Net              460 ml      Physical Exam   Constitutional: No distress.   Cachectic    HENT:   Head: Normocephalic and atraumatic.   Eyes: Scleral icterus is present.   Pale conjunctivae    Neck: No JVD present.   Cardiovascular: Normal rate, regular rhythm and normal heart sounds.    No murmur heard.  Pulmonary/Chest: Effort normal and breath sounds normal. No respiratory distress. He has no wheezes. He has no rales.   Abdominal: Bowel sounds are normal. He exhibits no distension (after paracentesis ). There is no tenderness.    Tense and distended   Musculoskeletal: He exhibits edema (+3 edema till thigh).   Neurological:   Disoriented x3  + astrexis    Skin: He is not diaphoretic.   + spider angioma            Assessment/Plan:      * Decompensated hepatic cirrhosis  - from limited history most likely 2/2 EtOH cirrhosis. Does not appear to have transaminitis elevation >1000k so tylenol, acute hep, shock, Budd Chiari less likely. Also consider autoimmune, morgan's, chronic viral hepatitis, hemachromatosis  -Hep panel with hep C Ab positive. Negative hep B surface Ag  Hepatitis B Surface Ag Negative   Hep B C IgM Negative   Hep A IgM Negative   Hepatitis C Ab Positive (A)   - f/u hep c PCR  HIV ab1/2 negative.     -IgG elevated, anti smooth muscle ab negative, ceruloplasmin low  -Ferritin pending  -US Liver w/ doppler with micronodular cirrhosis   -paracentesis was done with 5 liter of fluid., waiting for ascitic fluid studies.   -UA clear, CxR without acute cardiopulmonary abnormality, BCx NGTD  -CTX 1g q24h for SBP ppx    MELD-Na score: 25 at 2/15/2017  6:02 AM  MELD score: 25 at 2/15/2017  6:02 AM  Calculated from:  Serum Creatinine: 1.2 mg/dL at 2/15/2017  6:02 AM  Serum Sodium: 141 mmol/L (Rounded to 137) at 2/15/2017  6:02 AM  Total Bilirubin: 5.1 mg/dL at 2/15/2017  6:02 AM  INR(ratio): 2.5 at 2/15/2017  6:02 AM  Age: 51 years  -Hepatology on board and recommend giving albumin daily instead of q6h, not candidate for liver transplant evaluation.       Hyponatremia  -Most likely 2/2 decompensated cirrhosis, improving  - serum osm with 280 almost hypotonic range and overloaded with Yvonne <20 --> DDx: CHF or cirrhosis   - fluid restriction and low salt diet.       Acute renal failure  -Cr. 2.0 on unknown baseline, unknown etiology possibly pre-renal vs ATN vs HRS  -Urine sodium < 20 with FENa with pre-renal picture, urine protein/creatinine ratio normal 0.11  -Retroperitoneal US normal sizes  -UA unremarkable, still oliguric.   -- his cr  improved to 1.6 with midodrine 10 mg TID, octreotide 10mcq TID, and albumin 25 gm daily  -- renal function now WNL; retaining urine, cleared with straight in-out cath    Hepatic encephalopathy  -Grade II-III, ammonia elevated, likely 2/2 decompensated cirrhosis  - still alerted with +astrexis  -Lactulose TID, rifaximin  -Seizure precautions, fall precautions, aspiration precautions  -Neurochecks q4h  -Head CT was negative for acute events and utox is negative.     Hypercalcemia  - corrected Ca on admission is 12.4  - PTH is low normal --> unlikely hyperparathyroidism   - vitamin D is low -- > unlikely vitamin D toxicity or granulomatous diseases   - TSH is normal   --  PTHrP, UPEP, in process  SPEP: Decreased total protein.   Increased gamma globulin, polyclonal.   No paraprotein bands are detected; cirrhotic pattern.     Coagulopathy  -Likely 2/2 liver dz,   - vitamin K 10 mg orally for 3 days.       Thrombocytopenia, unspecified  -Likely 2/2 liver dz,       Macrocytic anemia  -Most likely due to chronic dz  -Vit B12, folate normal  - received PRBC 1 unit yesterday with good response.       History of rapid strep test  -Rapid strep positive at OSH  -Will get strep Cx here  -CTX should provide good coverage if true positive      Protein-calorie malnutrition, severe  -Likely 2/2 malnutrition from EtOH and liver dz  -Nutrition consulted  - on thiamine       ETOH abuse  -Reports heavy EtOH consumption, unsure of last drink, his wife mentioned frequent seizure after d/c of alcohol   -Thiamine, folate, multivitamin        Hepatitis C antibody test positive  -- ordered Hep C RNA quantitative, PCR,      Hypomagnesemia  -- replace       VTE Risk Mitigation         Ordered     Medium Risk of VTE  Once      02/11/17 0702     Place sequential compression device  Until discontinued      02/11/17 0702     Place AMA hose  Until discontinued      02/11/17 0702          Robin Sánchez MD  Department of Hospital Medicine   [facility]

## 2017-02-16 NOTE — PROGRESS NOTES
[facility] Hospital Medicine  Progress Note    Patient Name: Ricky Parsons  MRN: [id number]  Patient Class: IP- Inpatient   Admission Date: 2/10/2017  Length of Stay: 5 days  Attending Physician: Daniela Whiteside MD  Primary Care Provider: Provider [facility] Medicine Team: [facility] MED 4 Ryan Guthrie MD    Subjective:     Principal Problem:Decompensated hepatic cirrhosis    HPI:  Mr. Parsons is a 52 yo male no reported PMH per pt, per OSH records PMH CVA and wheelchair bound who presents as a transfer from [facility] per documentation there after a fall from chair and generalized weakness. Was found to be disoriented with abdominal distension and ascites so transferred to [facility] [place] liver evaluation. Found to be hyponatremic with a lactic acidosis to 3.3 there but hemodynamically stable. On arrival noted to have stable vital signs and evaluated by ICU. He reported then being cold and having heartburn but currently has no complaints and is significantly drowsy. Denies taking any medications at home. Consumes about 6 beers/day but does not know last drink. Per OSH ED notes was seeing bugs on sheets.     Notable ED studies from [place], ABG 7.57/79.1/29.5. Rapid strep positive, troponin negative, EtOH negative, CPK 80. UDS negative. Attempted to contact Ms. Sher (listed in demographics as emergency contact) and number disconnected. No number for his spouse is provided in transfer documentation.        Hospital Course:  Patient started on lactulose and rifaximin for HE and on rocephin empirically, seen by nephrology and recommend midodrine, albumin and octreotide to improve his MAP and his STEPHANIE, hepatology is on board for liver eval and recommend diagnostic paracentesis, his H/H dropped , consents were taken for paracentesis and PRBC    2/15 Hepatology has determined patient is not a candidate for liver transplant evaluation due insufficient health insurance,  ongoing substance abuse issues, poor insight into disease. Paracentesis performed with findings of high SAAG (>1.1) and few PMNs (10).       Interval History: patient seen today, had one episode of elevated HR above 150 for 5 minutes then back to low 100, EKG found Afib with RVR, in his stay he had high rate in 100-120, TSH was negative, otherwise he still disoriented, didn't complain of pain, passing BM.     Review of Systems   Constitutional: Negative for chills and fever.   Respiratory: Negative for cough, shortness of breath and wheezing.    Cardiovascular: Positive for leg swelling. Negative for chest pain and palpitations.   Gastrointestinal: Positive for abdominal distention (improved after paracentesis ). Negative for abdominal pain, constipation, nausea and vomiting.   Genitourinary: Positive for decreased urine volume. Negative for dysuria.   Musculoskeletal: Negative for neck pain.   Psychiatric/Behavioral: Positive for confusion. Negative for agitation.     Objective:     Vital Signs (Most Recent):  Temp: 98.4 °F (36.9 °C) (02/16/17 1013)  Pulse: 108 (02/16/17 1100)  Resp: 18 (02/16/17 1013)  BP: 111/74 (02/16/17 1013)  SpO2: 97 % (02/16/17 1013) Vital Signs (24h Range):  Temp:  [97.5 °F (36.4 °C)-98.8 °F (37.1 °C)] 98.4 °F (36.9 °C)  Pulse:  [] 108  Resp:  [15-18] 18  SpO2:  [96 %-98 %] 97 %  BP: ()/(57-92) 111/74     Weight: 81.6 kg (180 lb)  Body mass index is 27.37 kg/(m^2).    Intake/Output Summary (Last 24 hours) at 02/16/17 1248  Last data filed at 02/16/17 1100   Gross per 24 hour   Intake              630 ml   Output              900 ml   Net             -270 ml      Physical Exam   Constitutional: No distress.   Cachectic    HENT:   Head: Normocephalic and atraumatic.   Eyes: Scleral icterus is present.   Pale conjunctivae    Neck: No JVD present.   Cardiovascular: Normal rate, regular rhythm and normal heart sounds.    No murmur heard.  Pulmonary/Chest: Effort normal and breath  sounds normal. No respiratory distress. He has no wheezes. He has no rales.   Abdominal: Bowel sounds are normal. He exhibits no distension (after paracentesis ). There is no tenderness.   Tense and distended   Musculoskeletal: He exhibits edema (+3 edema till thigh).   Neurological:   Disoriented x3  + astrexis    Skin: He is not diaphoretic.   + spider angioma        Significant Labs:   Bilirubin:   Recent Labs  Lab 02/12/17  0446 02/13/17  0510 02/14/17  0514 02/15/17  0602 02/16/17  0522   BILITOT 3.1* 2.5* 3.8* 5.1* 6.0*       CBC:   Recent Labs  Lab 02/15/17  0602 02/16/17  0522   WBC 6.20 5.81   HGB 8.4* 8.1*   HCT 24.7* 23.6*   PLT 42* 35*     CMP:   Recent Labs  Lab 02/15/17  0602 02/16/17  0522    140   K 3.6 3.6    107   CO2 29 27   GLU 99 91   BUN 17 10   CREATININE 1.2 0.8   CALCIUM 9.1 8.2*   PROT 5.5* 5.1*   ALBUMIN 2.7* 2.5*   BILITOT 5.1* 6.0*   ALKPHOS 51* 47*   AST 56* 57*   ALT 20 22   ANIONGAP 5* 6*   EGFRNONAA >60.0 >60.0           Assessment/Plan:      * Decompensated hepatic cirrhosis  -- likely due to his alcohol abuse and hep C  -Hep panel with hep C Ab positive. Negative hep B surface Ag  Hepatitis B Surface Ag Negative   Hep B C IgM Negative   Hep A IgM Negative   Hepatitis C Ab Positive (A)   -hep c PCR with 12,000 copies  - HIV ab1/2 negative.     -IgG elevated, anti smooth muscle ab negative, ceruloplasmin low  -Ferritin pending  -US Liver w/ doppler with micronodular cirrhosis   -paracentesis was done with 5 liter of fluid., WBC <250  -UA clear, CxR without acute cardiopulmonary abnormality, BCx NGTD  -finished rocephin 5 days.    MELD-Na score: 24 at 2/16/2017  5:22 AM  MELD score: 24 at 2/16/2017  5:22 AM  Calculated from:  Serum Creatinine: 0.8 mg/dL (Rounded to 1) at 2/16/2017  5:22 AM  Serum Sodium: 140 mmol/L (Rounded to 137) at 2/16/2017  5:22 AM  Total Bilirubin: 6.0 mg/dL at 2/16/2017  5:22 AM  INR(ratio): 2.7 at 2/16/2017  5:22 AM  Age: 51 years  - hepatology  decline liver transplant evaluation.       Hepatic encephalopathy  -Grade II-III, ammonia elevated, likely 2/2 decompensated cirrhosis  - still alerted with +astrexis  -Lactulose TID, rifaximin  -Seizure precautions, fall precautions, aspiration precautions  -Neurochecks q4h  -Head CT was negative for acute events and utox is negative.     Coagulopathy  -Likely 2/2 liver dz,   - his INR is therapeutic for Afib      Thrombocytopenia, unspecified  -Likely 2/2 liver dz,       Macrocytic anemia  -Most likely due to chronic dz  -Vit B12, folate normal  - received PRBC 1 unit once, his H/H is stable.       Protein-calorie malnutrition, severe  -Likely 2/2 malnutrition from EtOH and liver dz  -Nutrition consulted  - on thiamine       ETOH abuse  -Reports heavy EtOH consumption, unsure of last drink, his wife mentioned frequent seizure after d/c of alcohol   -Thiamine, folate, multivitamin        Vitamin D insufficiency  -- vitamin D replacement       Moderate protein-calorie malnutrition        Atrial fibrillation  -- don't know the duration of his Afib, since admission his HR in low 100,   -- today has one episode of  for 5 minutes, EKG with Afib +RVR  -- TSH was normal.  -- his INR is therapeutic for Afib due to his coagulopathic.  -- can't calculate his CHADVASC score due to his encephalopathy.  -- start low dose lopressor 12.5 mg BID while watching his blood pressure.        Chronic hepatitis C with cirrhosis  -- positive hep C ab and his PCR above 12,000.  -- defer treatment to outpatient setting with genotype testing.       Hyponatremia, resolved as of 2/16/2017  -Most likely 2/2 decompensated cirrhosis, resolved.   - serum osm with 280 almost hypotonic range and overloaded with Yvonne <20 --> DDx: CHF or cirrhosis   - continue fluid restriction and low salt diet.       Acute renal failure, resolved as of 2/16/2017  -Cr. 2.0 on unknown baseline, unknown etiology possibly pre-renal vs ATN vs HRS  -Urine sodium < 20  with FENa with pre-renal picture, urine protein/creatinine ratio normal 0.11  -Retroperitoneal US normal sizes  -UA unremarkable, still oliguric.   -- his cr improved to 1.6 with midodrine 10 mg TID, octreotide 10mcq TID, and albumin 25 gm daily, will d/c HRS protocol.  -- renal function now WNL; retaining urine, cleared with straight in-out cath  -- straight in-out cath prn and bladder scan prn.       Hypercalcemia, resolved as of 2/16/2017  - corrected Ca on admission is 12.4  - PTH is low normal --> unlikely hyperparathyroidism   - vitamin D is low -- > unlikely vitamin D toxicity or granulomatous diseases   - TSH is normal   --  PTHrP, UPEP, in process  SPEP: Decreased total protein.   Increased gamma globulin, polyclonal.   No paraprotein bands are detected; cirrhotic pattern.   -- repeated Ca is WNL    Hypokalemia, resolved as of 2/12/2017  -Replaced      VTE Risk Mitigation         Ordered     Medium Risk of VTE  Once      02/11/17 0702     Place sequential compression device  Until discontinued      02/11/17 0702     Place AMA hose  Until discontinued      02/11/17 0702          Ryan Guthrie MD  Department of Hospital Medicine   [facility]

## 2017-02-16 NOTE — SUBJECTIVE & OBJECTIVE
Interval History: NAEON. Patient remains clinically stable. Less agitated today. Mechanical restraints removed.    Review of Systems   Constitutional: Negative for chills and fever.   Respiratory: Negative for cough, shortness of breath and wheezing.    Cardiovascular: Positive for leg swelling. Negative for chest pain and palpitations.   Gastrointestinal: Positive for abdominal distention. Negative for abdominal pain, constipation, nausea and vomiting.   Genitourinary: Positive for decreased urine volume. Negative for dysuria.   Musculoskeletal: Negative for neck pain.   Psychiatric/Behavioral: Positive for confusion. Negative for agitation.     Objective:     Vital Signs (Most Recent):  Temp: 98.6 °F (37 °C) (02/15/17 2010)  Pulse: (!) 116 (02/15/17 2100)  Resp: 16 (02/15/17 2010)  BP: 106/66 (02/15/17 2010)  SpO2: 96 % (02/15/17 2010) Vital Signs (24h Range):  Temp:  [98 °F (36.7 °C)-98.7 °F (37.1 °C)] 98.6 °F (37 °C)  Pulse:  [104-166] 116  Resp:  [14-18] 16  SpO2:  [94 %-98 %] 96 %  BP: ()/(62-92) 106/66     Weight: 81.6 kg (180 lb)  Body mass index is 27.37 kg/(m^2).    Intake/Output Summary (Last 24 hours) at 02/15/17 2213  Last data filed at 02/15/17 1600   Gross per 24 hour   Intake              960 ml   Output              500 ml   Net              460 ml      Physical Exam   Constitutional: No distress.   Cachectic    HENT:   Head: Normocephalic and atraumatic.   Eyes: Scleral icterus is present.   Pale conjunctivae    Neck: No JVD present.   Cardiovascular: Normal rate, regular rhythm and normal heart sounds.    No murmur heard.  Pulmonary/Chest: Effort normal and breath sounds normal. No respiratory distress. He has no wheezes. He has no rales.   Abdominal: Bowel sounds are normal. He exhibits no distension (after paracentesis ). There is no tenderness.   Tense and distended   Musculoskeletal: He exhibits edema (+3 edema till thigh).   Neurological:   Disoriented x3  + astrexis    Skin: He is not  diaphoretic.   + spider angioma

## 2017-02-16 NOTE — PLAN OF CARE
Discharge Update: Pt's daughter Tashia no longer wants to be involved in pt's care and decision making. Tashia advised SW to start calling pt's sister in law, Maxine Dunbar [phone]. SW informed pt's sister in law that we are looking for nursing home placement in the [place] area and once pt gets accepted SW will provide sister in law resources to start nursing placement for pt's wife. Pt's sister in law says she can probably provide shelter temporarily for pt's wife when pt is discharged.    Nannette Quintero, YANICK  [id number]

## 2017-02-16 NOTE — PLAN OF CARE
Problem: Liver Failure, Acute/Chronic (Adult)  Goal: Signs and Symptoms of Listed Potential Problems Will be Absent, Minimized or Managed (Liver Failure, Acute/Chronic)  Signs and symptoms of listed potential problems will be absent, minimized or managed by discharge/transition of care (reference Liver Failure, Acute/Chronic (Adult) CPG).   Outcome: Ongoing (interventions implemented as appropriate)  51 [facility] Pt continues to be confused. Pt had a sustained run of heart rate in the 160s-170s with a EKG showing AFib with RVR. Pt free from falls and injury throughout shift. EGD was cancelled due to pt refused procedure. No signs or symptoms of bleeding.

## 2017-02-16 NOTE — ASSESSMENT & PLAN NOTE
- from limited history most likely 2/2 EtOH cirrhosis. Does not appear to have transaminitis elevation >1000k so tylenol, acute hep, shock, Budd Chiari less likely. Also consider autoimmune, morgan's, chronic viral hepatitis, hemachromatosis  -Hep panel with hep C Ab positive. Negative hep B surface Ag  Hepatitis B Surface Ag Negative   Hep B C IgM Negative   Hep A IgM Negative   Hepatitis C Ab Positive (A)   - f/u hep c PCR  HIV ab1/2 negative.     -IgG elevated, anti smooth muscle ab negative, ceruloplasmin low  -Ferritin pending  -US Liver w/ doppler with micronodular cirrhosis   -paracentesis was done with 5 liter of fluid., waiting for ascitic fluid studies.   -UA clear, CxR without acute cardiopulmonary abnormality, BCx NGTD  -CTX 1g q24h for SBP ppx    MELD-Na score: 25 at 2/15/2017  6:02 AM  MELD score: 25 at 2/15/2017  6:02 AM  Calculated from:  Serum Creatinine: 1.2 mg/dL at 2/15/2017  6:02 AM  Serum Sodium: 141 mmol/L (Rounded to 137) at 2/15/2017  6:02 AM  Total Bilirubin: 5.1 mg/dL at 2/15/2017  6:02 AM  INR(ratio): 2.5 at 2/15/2017  6:02 AM  Age: 51 years  -Hepatology on board and recommend giving albumin daily instead of q6h, not candidate for liver transplant evaluation.

## 2017-02-16 NOTE — PLAN OF CARE
Problem: Fall Risk (Adult)  Intervention: Safety Precautions  Pt on bedrest. Wife to remain at bedside. Bed alarm set, [employer] camera in place for surveillance, frequent checks completed. Remains free of falls.       Problem: Patient Care Overview  Goal: Plan of Care Review  Outcome: Ongoing (interventions implemented as appropriate)  AVSS - HR tachy at times (see previous note, highest up to 160bpm briefly). NPO 0000 - pt & wife verbalize understanding. Pt did not void spontaneously throughout shift - order for in/out catheter obtained - 600ccs of az urine at 0300.     Problem: Pressure Ulcer Risk (Chaparro Scale) (Adult,Obstetrics,Pediatric)  Intervention: Turn/Reposition Often  Repositions independently.       Problem: Infection, Risk/Actual (Adult)  Intervention: Prevent Infection/Maximize Resistance  Afebrile. Awakenings minimized. Personal hygiene promoted.       Problem: Liver Failure, Acute/Chronic (Adult)  Intervention: Promote Neurologic Homeostasis  Pt on RA - spO2 95%+. Pt alert, oriented to self & situation at times. Lactulose administered as ordered - 3BM's this shift. Skin tears to R arm noted - cleansed with sterile NS, mepilex dressing applied. Denies pain & pruritis. CIWA highest at 13 - positive for disorientation, restlessness, & expressions of anxiety. Reorientation & reassurance provided, stimulation minimized. Thiamine given as ordered.

## 2017-02-16 NOTE — ASSESSMENT & PLAN NOTE
-Cr. 2.0 on unknown baseline, unknown etiology possibly pre-renal vs ATN vs HRS  -Urine sodium < 20 with FENa with pre-renal picture, urine protein/creatinine ratio normal 0.11  -Retroperitoneal US normal sizes  -UA unremarkable, still oliguric.   -- his cr improved to 1.6 with midodrine 10 mg TID, octreotide 10mcq TID, and albumin 25 gm daily, will d/c HRS protocol.  -- renal function now WNL; retaining urine, cleared with straight in-out cath  -- straight in-out cath prn and bladder scan prn.

## 2017-02-16 NOTE — ASSESSMENT & PLAN NOTE
-- don't know the duration of his Afib, since admission his HR in low 100,   -- today has one episode of  for 5 minutes, EKG with Afib +RVR  -- TSH was normal.  -- his INR is therapeutic for Afib due to his coagulopathic.  -- can't calculate his CHADVASC score due to his encephalopathy.  -- start low dose lopressor 12.5 mg BID while watching his blood pressure.

## 2017-02-16 NOTE — NURSING
Pt had 2 episodes where HR tachy up to 160 & then quickly back down to 80-100s. On-call notified, not giving PRN lorazepam as HR >110 is not sustained. Pt resting comfortably with no complaints at this time.

## 2017-02-16 NOTE — SUBJECTIVE & OBJECTIVE
Interval History: patient seen today, had one episode of elevated HR above 150 for 5 minutes then back to low 100, EKG found Afib with RVR, in his stay he had high rate in 100-120, TSH was negative, otherwise he still disoriented, didn't complain of pain, passing BM.     Review of Systems   Constitutional: Negative for chills and fever.   Respiratory: Negative for cough, shortness of breath and wheezing.    Cardiovascular: Positive for leg swelling. Negative for chest pain and palpitations.   Gastrointestinal: Positive for abdominal distention (improved after paracentesis ). Negative for abdominal pain, constipation, nausea and vomiting.   Genitourinary: Positive for decreased urine volume. Negative for dysuria.   Musculoskeletal: Negative for neck pain.   Psychiatric/Behavioral: Positive for confusion. Negative for agitation.     Objective:     Vital Signs (Most Recent):  Temp: 98.4 °F (36.9 °C) (02/16/17 1013)  Pulse: 108 (02/16/17 1100)  Resp: 18 (02/16/17 1013)  BP: 111/74 (02/16/17 1013)  SpO2: 97 % (02/16/17 1013) Vital Signs (24h Range):  Temp:  [97.5 °F (36.4 °C)-98.8 °F (37.1 °C)] 98.4 °F (36.9 °C)  Pulse:  [] 108  Resp:  [15-18] 18  SpO2:  [96 %-98 %] 97 %  BP: ()/(57-92) 111/74     Weight: 81.6 kg (180 lb)  Body mass index is 27.37 kg/(m^2).    Intake/Output Summary (Last 24 hours) at 02/16/17 1248  Last data filed at 02/16/17 1100   Gross per 24 hour   Intake              630 ml   Output              900 ml   Net             -270 ml      Physical Exam   Constitutional: No distress.   Cachectic    HENT:   Head: Normocephalic and atraumatic.   Eyes: Scleral icterus is present.   Pale conjunctivae    Neck: No JVD present.   Cardiovascular: Normal rate, regular rhythm and normal heart sounds.    No murmur heard.  Pulmonary/Chest: Effort normal and breath sounds normal. No respiratory distress. He has no wheezes. He has no rales.   Abdominal: Bowel sounds are normal. He exhibits no distension  (after paracentesis ). There is no tenderness.   Tense and distended   Musculoskeletal: He exhibits edema (+3 edema till thigh).   Neurological:   Disoriented x3  + astrexis    Skin: He is not diaphoretic.   + spider angioma        Significant Labs:   Bilirubin:   Recent Labs  Lab 02/12/17  0446 02/13/17  0510 02/14/17  0514 02/15/17  0602 02/16/17  0522   BILITOT 3.1* 2.5* 3.8* 5.1* 6.0*       CBC:   Recent Labs  Lab 02/15/17  0602 02/16/17  0522   WBC 6.20 5.81   HGB 8.4* 8.1*   HCT 24.7* 23.6*   PLT 42* 35*     CMP:   Recent Labs  Lab 02/15/17  0602 02/16/17  0522    140   K 3.6 3.6    107   CO2 29 27   GLU 99 91   BUN 17 10   CREATININE 1.2 0.8   CALCIUM 9.1 8.2*   PROT 5.5* 5.1*   ALBUMIN 2.7* 2.5*   BILITOT 5.1* 6.0*   ALKPHOS 51* 47*   AST 56* 57*   ALT 20 22   ANIONGAP 5* 6*   EGFRNONAA >60.0 >60.0

## 2017-02-16 NOTE — NURSING
Wife at bedside extremely worried about where she and her  will live when they are discharged from hospital. RN reiterated that social workers and  are actively seeking placement and that when it is known where she and her  are going it will be passed on to her. Wife verbalized understand but still appears very anxious about discharge and where she will go afterwards.

## 2017-02-16 NOTE — PLAN OF CARE
[facility] does not have any male beds available.    Nannette Quintero, YANICK  [id number]

## 2017-02-16 NOTE — ASSESSMENT & PLAN NOTE
-Cr. 2.0 on unknown baseline, unknown etiology possibly pre-renal vs ATN vs HRS  -Urine sodium < 20 with FENa with pre-renal picture, urine protein/creatinine ratio normal 0.11  -Retroperitoneal US normal sizes  -UA unremarkable, still oliguric.   -- his cr improved to 1.6 with midodrine 10 mg TID, octreotide 10mcq TID, and albumin 25 gm daily  -- renal function now WNL; retaining urine, cleared with straight in-out cath

## 2017-02-16 NOTE — ASSESSMENT & PLAN NOTE
-Most likely due to chronic dz  -Vit B12, folate normal  - received PRBC 1 unit once, his H/H is stable.

## 2017-02-16 NOTE — ASSESSMENT & PLAN NOTE
- corrected Ca on admission is 12.4  - PTH is low normal --> unlikely hyperparathyroidism   - vitamin D is low -- > unlikely vitamin D toxicity or granulomatous diseases   - TSH is normal   --  PTHrP, UPEP, in process  SPEP: Decreased total protein.   Increased gamma globulin, polyclonal.   No paraprotein bands are detected; cirrhotic pattern.   -- repeated Ca is WNL

## 2017-02-16 NOTE — ASSESSMENT & PLAN NOTE
-- positive hep C ab and his PCR above 12,000.  -- defer treatment to outpatient setting with genotype testing.

## 2017-02-16 NOTE — ASSESSMENT & PLAN NOTE
-Most likely 2/2 decompensated cirrhosis, resolved.   - serum osm with 280 almost hypotonic range and overloaded with Yvonne <20 --> DDx: CHF or cirrhosis   - continue fluid restriction and low salt diet.

## 2017-02-16 NOTE — ASSESSMENT & PLAN NOTE
-- likely due to his alcohol abuse and hep C  -Hep panel with hep C Ab positive. Negative hep B surface Ag  Hepatitis B Surface Ag Negative   Hep B C IgM Negative   Hep A IgM Negative   Hepatitis C Ab Positive (A)   -hep c PCR with 12,000 copies  - HIV ab1/2 negative.     -IgG elevated, anti smooth muscle ab negative, ceruloplasmin low  -Ferritin pending  -US Liver w/ doppler with micronodular cirrhosis   -paracentesis was done with 5 liter of fluid., WBC <250  -UA clear, CxR without acute cardiopulmonary abnormality, BCx NGTD  -finished rocephin 5 days.    MELD-Na score: 24 at 2/16/2017  5:22 AM  MELD score: 24 at 2/16/2017  5:22 AM  Calculated from:  Serum Creatinine: 0.8 mg/dL (Rounded to 1) at 2/16/2017  5:22 AM  Serum Sodium: 140 mmol/L (Rounded to 137) at 2/16/2017  5:22 AM  Total Bilirubin: 6.0 mg/dL at 2/16/2017  5:22 AM  INR(ratio): 2.7 at 2/16/2017  5:22 AM  Age: 51 years  - hepatology decline liver transplant evaluation.

## 2017-02-16 NOTE — PROGRESS NOTES
Progress Note  Hepatology    Admit Date: 2/10/2017   LOS: 5 days     SUBJECTIVE:     Follow-up For: Decompensated cirrhosis - transplant evaluation    HPI: Ricky Parsons is a 51 y.o. gentleman with PMH of CVA , wheelchair bound, EtOH abuse who presents to [facility] as a transfer from [facility] for hepatology evaluation. Per report pt presented to OSH after a fall from chair and generalized weakness. His wife reports that he has a history of seizures but she cannot recall last seizure. They live in [place]. The patient has [place] medicaid.     Interval history: pt is more awake today, sitting in chair and eating breakfast . Wife is at bedside, of note she also has a history of recent stroke and providing history by her might also be influenced per chart review from other providers. No problems overnight. Pt was transfused 1 U PRBC and ordered vit K, denies melena or hematochezia  2/15: overnight pt had episode of tachycardia in 150s-160s with hypotension that seems to have responded to 500 CC IIVF, pt epic pt had 8 BMs  2/16: pt had another episode of tachycardia , ECG showed Afib with RVR    Scheduled Meds:   [START ON 2/22/2017] ergocalciferol  50,000 Units Oral Q7 Days    ferric gluconate (FERRLECIT) IVPB  125 mg Intravenous Every Tues, Thurs, Sat    folic acid  1 mg Oral Daily    lactulose  20 g Oral BID    magnesium sulfate IVPB  2 g Intravenous Once    metoprolol tartrate  12.5 mg Oral BID    multivitamin  1 tablet Oral Daily    pantoprazole  40 mg Oral Daily    rifAXIMimin  550 mg Oral BID    thiamine (VITAMIN B1) IVPB  100 mg Intravenous Q24H     Continuous Infusions:   PRN Meds:dextrose 50%, dextrose 50%, glucagon (human recombinant), glucose, glucose, ondansetron    OBJECTIVE:     Physical Exam:  Vital Signs (Most Recent)  Temp: 98.4 °F (36.9 °C) (02/16/17 1013)  Pulse: 108 (02/16/17 1100)  Resp: 18 (02/16/17 1013)  BP: 111/74 (02/16/17 1013)  SpO2: 97 % (02/16/17  1013)    Temperature Range Min/Max (Last 24H):  Temp:  [97.5 °F (36.4 °C)-98.8 °F (37.1 °C)]     General appearance: alert, appears stated age and cooperative.  Eyes: negative findings: conjunctivae and scleral icterus.   Throat: lips, mucosa, and tongue normal.  Neck, no JVD, trachea midline  Abdomen: soft, non-tender; bowel sounds normal; no masses, distended  Skin: No rashes or lesions to exposed areas; jaundice appreciated.  Ext, LE edema +1    Laboratory:    Recent Labs  Lab 02/14/17  0514 02/15/17  0602 02/16/17  0522    141 140   K 3.9 3.6 3.6    107 107   CO2 26 29 27   BUN 27* 17 10   CREATININE 1.6* 1.2 0.8   CALCIUM 9.4 9.1 8.2*   PROT 5.6* 5.5* 5.1*   BILITOT 3.8* 5.1* 6.0*   ALKPHOS 58 51* 47*   ALT 22 20 22   AST 58* 56* 57*         Recent Labs  Lab 02/14/17  0514 02/15/17  0602 02/16/17  0522   WBC 5.27 6.20 5.81   HGB 8.4* 8.4* 8.1*   HCT 24.1* 24.7* 23.6*   PLT 46* 42* 35*         Recent Labs  Lab 02/14/17  0514 02/15/17  0602 02/16/17  0522   INR 2.5* 2.5* 2.7*       MELD-Na score: 24 at 2/16/2017  5:22 AM  MELD score: 24 at 2/16/2017  5:22 AM  Calculated from:  Serum Creatinine: 0.8 mg/dL (Rounded to 1) at 2/16/2017  5:22 AM  Serum Sodium: 140 mmol/L (Rounded to 137) at 2/16/2017  5:22 AM  Total Bilirubin: 6.0 mg/dL at 2/16/2017  5:22 AM  INR(ratio): 2.7 at 2/16/2017  5:22 AM  Age: 51 years    Assessment:  Ricky Parsons is a 51 y.o. gentleman with PMH of CVA , wheelchair bound, EtOH abuse and reported seizure history who presents to [facility] as a transfer from [facility] for hepatology evaluation.    1. Decompensated (suspected) alcohol cirrhosis -  His liver disease is complicated by:  · Hepatic encephalopathy  · Ascites  2. AFib with RVR    Plan:  1. Patient is not a transplant candidate given insurance - [place] medicaid, ongoing substance abuse issues, poor insight into disease.   2. Para did not reveal infection although samples were drawn while pt had already received  abx. Pt completed 5 days of ceftriaxone,  3. Continue lactulose and titrate to 3-4 BM's per day pt reported to have 8 lastnight.   4. Offered an EGD to pt for further evaluation of anemia and need for assessment of esophageal varices , pt  refused   5. Elevated INR, DDX is cirrhoses vs vit K deficiency, please give one more dose of IV vitamin K  6. Please Dc HRS medicines including albumin, octreotide and midodrine   7. Recommend taking off the fluid restriction  8. Recommend work up for Afib including TSH and infectious , JAGDEEP Lopez MD PGY2      Staff attestation to follow

## 2017-02-16 NOTE — NURSING
MD aware of results of 12 lead - AFib with RVR. MD states he will put in orders for Lopressor for rate control. Will continue to monitor pt closely.

## 2017-02-17 PROBLEM — B19.20 HEPATITIS C: Status: ACTIVE | Noted: 2017-02-17

## 2017-02-17 PROBLEM — E83.39 HYPOPHOSPHATEMIA: Status: ACTIVE | Noted: 2017-02-17

## 2017-02-17 PROBLEM — K76.82 HEPATIC ENCEPHALOPATHY: Status: RESOLVED | Noted: 2017-02-11 | Resolved: 2017-02-17

## 2017-02-17 LAB
ALBUMIN SERPL BCP-MCNC: 2.5 G/DL
ALP SERPL-CCNC: 65 U/L
ALT SERPL W/O P-5'-P-CCNC: 31 U/L
ANION GAP SERPL CALC-SCNC: 6 MMOL/L
AST SERPL-CCNC: 84 U/L
BACTERIA SPEC AEROBE CULT: NO GROWTH
BASOPHILS # BLD AUTO: 0.02 K/UL
BASOPHILS NFR BLD: 0.3 %
BILIRUB SERPL-MCNC: 8 MG/DL
BUN SERPL-MCNC: 10 MG/DL
CALCIUM SERPL-MCNC: 7.7 MG/DL
CHLORIDE SERPL-SCNC: 99 MMOL/L
CO2 SERPL-SCNC: 26 MMOL/L
CREAT SERPL-MCNC: 1 MG/DL
DIASTOLIC DYSFUNCTION: NO
DIFFERENTIAL METHOD: ABNORMAL
EOSINOPHIL # BLD AUTO: 0.2 K/UL
EOSINOPHIL NFR BLD: 2.6 %
ERYTHROCYTE [DISTWIDTH] IN BLOOD BY AUTOMATED COUNT: 23.2 %
EST. GFR  (AFRICAN AMERICAN): >60 ML/MIN/1.73 M^2
EST. GFR  (NON AFRICAN AMERICAN): >60 ML/MIN/1.73 M^2
ESTIMATED PA SYSTOLIC PRESSURE: 16.65
GLUCOSE SERPL-MCNC: 109 MG/DL
HCT VFR BLD AUTO: 23.2 %
HGB BLD-MCNC: 7.8 G/DL
INR PPP: 2.8
LYMPHOCYTES # BLD AUTO: 1.3 K/UL
LYMPHOCYTES NFR BLD: 21.4 %
MAGNESIUM SERPL-MCNC: 1.3 MG/DL
MCH RBC QN AUTO: 35.5 PG
MCHC RBC AUTO-ENTMCNC: 33.6 %
MCV RBC AUTO: 106 FL
MITRAL VALVE REGURGITATION: NORMAL
MONOCYTES # BLD AUTO: 0.5 K/UL
MONOCYTES NFR BLD: 8.3 %
NEUTROPHILS # BLD AUTO: 4.2 K/UL
NEUTROPHILS NFR BLD: 67.1 %
PHOSPHATE SERPL-MCNC: 1.7 MG/DL
PLATELET # BLD AUTO: 32 K/UL
PMV BLD AUTO: 11.1 FL
POCT GLUCOSE: 117 MG/DL (ref 70–110)
POCT GLUCOSE: 124 MG/DL (ref 70–110)
POCT GLUCOSE: 124 MG/DL (ref 70–110)
POTASSIUM SERPL-SCNC: 3.3 MMOL/L
PROT SERPL-MCNC: 5.1 G/DL
PROTHROMBIN TIME: 28.5 SEC
RBC # BLD AUTO: 2.2 M/UL
RETIRED EF AND QEF - SEE NOTES: 60 (ref 55–65)
SODIUM SERPL-SCNC: 131 MMOL/L
TRICUSPID VALVE REGURGITATION: NORMAL
VIT B12 SERPL-MCNC: >2000 PG/ML
WBC # BLD AUTO: 6.26 K/UL

## 2017-02-17 PROCEDURE — 36415 COLL VENOUS BLD VENIPUNCTURE: CPT

## 2017-02-17 PROCEDURE — 63600175 PHARM REV CODE 636 W HCPCS: Performed by: HOSPITALIST

## 2017-02-17 PROCEDURE — 85025 COMPLETE CBC W/AUTO DIFF WBC: CPT

## 2017-02-17 PROCEDURE — 84100 ASSAY OF PHOSPHORUS: CPT

## 2017-02-17 PROCEDURE — 25000003 PHARM REV CODE 250: Performed by: INTERNAL MEDICINE

## 2017-02-17 PROCEDURE — 83735 ASSAY OF MAGNESIUM: CPT

## 2017-02-17 PROCEDURE — 25000003 PHARM REV CODE 250: Performed by: HOSPITALIST

## 2017-02-17 PROCEDURE — 25000003 PHARM REV CODE 250: Performed by: STUDENT IN AN ORGANIZED HEALTH CARE EDUCATION/TRAINING PROGRAM

## 2017-02-17 PROCEDURE — 82607 VITAMIN B-12: CPT

## 2017-02-17 PROCEDURE — 93306 TTE W/DOPPLER COMPLETE: CPT | Mod: 26,,, | Performed by: INTERNAL MEDICINE

## 2017-02-17 PROCEDURE — 63600175 PHARM REV CODE 636 W HCPCS: Performed by: STUDENT IN AN ORGANIZED HEALTH CARE EDUCATION/TRAINING PROGRAM

## 2017-02-17 PROCEDURE — 20600001 HC STEP DOWN PRIVATE ROOM

## 2017-02-17 PROCEDURE — 80053 COMPREHEN METABOLIC PANEL: CPT

## 2017-02-17 PROCEDURE — 93306 TTE W/DOPPLER COMPLETE: CPT

## 2017-02-17 PROCEDURE — 99239 HOSP IP/OBS DSCHRG MGMT >30: CPT | Mod: ,,, | Performed by: INTERNAL MEDICINE

## 2017-02-17 PROCEDURE — 85610 PROTHROMBIN TIME: CPT

## 2017-02-17 RX ORDER — METOPROLOL TARTRATE 25 MG/1
12.5 TABLET, FILM COATED ORAL 2 TIMES DAILY
Qty: 30 TABLET | Refills: 11 | Status: SHIPPED | OUTPATIENT
Start: 2017-02-17 | End: 2018-02-17

## 2017-02-17 RX ORDER — MAGNESIUM SULFATE HEPTAHYDRATE 40 MG/ML
2 INJECTION, SOLUTION INTRAVENOUS ONCE
Status: COMPLETED | OUTPATIENT
Start: 2017-02-17 | End: 2017-02-17

## 2017-02-17 RX ORDER — ERGOCALCIFEROL 1.25 MG/1
50000 CAPSULE ORAL
Qty: 12 CAPSULE | Refills: 6 | Status: SHIPPED | OUTPATIENT
Start: 2017-02-17

## 2017-02-17 RX ORDER — TAMSULOSIN HYDROCHLORIDE 0.4 MG/1
0.4 CAPSULE ORAL DAILY
Qty: 30 CAPSULE | Refills: 11 | Status: SHIPPED | OUTPATIENT
Start: 2017-02-17 | End: 2018-02-17

## 2017-02-17 RX ORDER — TAMSULOSIN HYDROCHLORIDE 0.4 MG/1
0.4 CAPSULE ORAL DAILY
Status: DISCONTINUED | OUTPATIENT
Start: 2017-02-17 | End: 2017-02-18 | Stop reason: HOSPADM

## 2017-02-17 RX ORDER — LACTULOSE 10 G/15ML
20 SOLUTION ORAL 3 TIMES DAILY PRN
Qty: 600 ML | Refills: 12 | Status: SHIPPED | OUTPATIENT
Start: 2017-02-17

## 2017-02-17 RX ADMIN — MAGNESIUM SULFATE IN WATER 2 G: 40 INJECTION, SOLUTION INTRAVENOUS at 11:02

## 2017-02-17 RX ADMIN — RIFAXIMIN 550 MG: 550 TABLET ORAL at 08:02

## 2017-02-17 RX ADMIN — THERA TABS 1 TABLET: TAB at 09:02

## 2017-02-17 RX ADMIN — THIAMINE HYDROCHLORIDE 100 MG: 100 INJECTION, SOLUTION INTRAMUSCULAR; INTRAVENOUS at 03:02

## 2017-02-17 RX ADMIN — SODIUM PHOSPHATE, MONOBASIC, MONOHYDRATE 30 MMOL: 276; 142 INJECTION, SOLUTION INTRAVENOUS at 09:02

## 2017-02-17 RX ADMIN — RIFAXIMIN 550 MG: 550 TABLET ORAL at 09:02

## 2017-02-17 RX ADMIN — TAMSULOSIN HYDROCHLORIDE 0.4 MG: 0.4 CAPSULE ORAL at 09:02

## 2017-02-17 RX ADMIN — LACTULOSE 20 G: 10 SOLUTION ORAL at 09:02

## 2017-02-17 RX ADMIN — FOLIC ACID 1 MG: 1 TABLET ORAL at 09:02

## 2017-02-17 RX ADMIN — PANTOPRAZOLE SODIUM 40 MG: 40 TABLET, DELAYED RELEASE ORAL at 09:02

## 2017-02-17 RX ADMIN — LACTULOSE 20 G: 10 SOLUTION ORAL at 08:02

## 2017-02-17 NOTE — PROGRESS NOTES
02/17/17 0908   Vital Signs   Temp 98.7 °F (37.1 °C)   Temp src Oral   Pulse 91   Heart Rate Source SpO2   Resp 17   SpO2 97 %   Pulse Oximetry Type Intermittent   O2 Device (Oxygen Therapy) room air   BP (!) 90/58   BP Location Left arm   BP Method Automatic   Patient Position Sitting   Dr. Stevo Whiteside was notified of VS, pt is asymptomatic. Metoprolol was held due to bp. PLT and mag was addressed.

## 2017-02-17 NOTE — ASSESSMENT & PLAN NOTE
-- likely due to his alcohol abuse and hep C  -Hep panel with hep C Ab positive. Negative hep B surface Ag  Hepatitis B Surface Ag Negative   Hep B C IgM Negative   Hep A IgM Negative   Hepatitis C Ab Positive (A)   -hep c PCR with 12,000 copies  - HIV ab1/2 negative.     -IgG elevated, anti smooth muscle ab negative, ceruloplasmin low  -Ferritin pending  -US Liver w/ doppler with micronodular cirrhosis   -paracentesis was done with 5 liter of fluid., WBC <250  -UA clear, CxR without acute cardiopulmonary abnormality, BCx NGTD  -finished rocephin 5 days.    MELD-Na score: 29 at 2/17/2017  5:48 AM  MELD score: 26 at 2/17/2017  5:48 AM  Calculated from:  Serum Creatinine: 1.0 mg/dL at 2/17/2017  5:48 AM  Serum Sodium: 131 mmol/L at 2/17/2017  5:48 AM  Total Bilirubin: 8.0 mg/dL at 2/17/2017  5:48 AM  INR(ratio): 2.8 at 2/17/2017  5:48 AM  Age: 51 years  - hepatology decline liver transplant evaluation.

## 2017-02-17 NOTE — NURSING
Nurse confirmed discharge will stay in place, SW arranging for transportation and will update nurse on time.

## 2017-02-17 NOTE — NURSING
"Pt's BP 88/60 taken manually while pt resting. Pt denies associated symptoms. Active ROM performed, fluids promoted. On-call notified - states "this is a regular thing for the pt, as long as he has no symptoms just continue to monitor." No new orders, will continue to monitor.   "

## 2017-02-17 NOTE — PLAN OF CARE
Problem: Fall Risk (Adult)  Intervention: Safety Precautions  Pt on bedrest. [employer] camera in room - did not alarm once this shift. Bed alarm set, frequent checks completed, call light in reach. Remains free of falls.       Problem: Patient Care Overview  Goal: Plan of Care Review  Outcome: Ongoing (interventions implemented as appropriate)  No acute changes/events overnight. AVSS - BP lowest at 88/60 (see previous note). On RA - spO2 >95%. Pt continues to retain urine, didn't void this shift. Bladder scanned pt for 400ccs, in/out catheter produced 300ccs of dark az urine.     Problem: Pressure Ulcer Risk (Chaparro Scale) (Adult,Obstetrics,Pediatric)  Intervention: Turn/Reposition Often  Repositions independently.       Problem: Liver Failure, Acute/Chronic (Adult)  Intervention: Promote Neurologic Homeostasis  Pt still disoriented x3 but more pleasant, less restless, & more cooperative/calm. Lactulose administered as ordered but no BM this shift - will continue to monitor. Thiamine infusing now - CIWA only postive for restlessness. Neuro checks completed Q4hrs with limited changes. Skin tear bleeding controlled with dressings. Denies pruritis & pain.

## 2017-02-17 NOTE — ASSESSMENT & PLAN NOTE
-Grade II-III, ammonia elevated, likely 2/2 decompensated cirrhosis  - still disoriented, but improved astrexis   -Lactulose TID, rifaximin  -Seizure precautions, fall precautions, aspiration precautions  -Neurochecks q4h  -Head CT was negative for acute events and utox is negative.

## 2017-02-17 NOTE — PROGRESS NOTES
[facility] Hospital Medicine  Progress Note    Patient Name: Ricky Parsons  MRN: [id number]  Patient Class: IP- Inpatient   Admission Date: 2/10/2017  Length of Stay: 6 days  Attending Physician: Daniela Whiteside MD  Primary Care Provider: Provider [facility] Medicine Team: [facility] HOSP MED 4 Ryan Guthrie MD    Subjective:     Principal Problem:Decompensated hepatic cirrhosis    HPI:  Mr. Parsons is a 50 yo male no reported PMH per pt, per OSH records PMH CVA and wheelchair bound who presents as a transfer from [facility] per documentation there after a fall from chair and generalized weakness. Was found to be disoriented with abdominal distension and ascites so transferred to [facility] for liver evaluation. Found to be hyponatremic with a lactic acidosis to 3.3 there but hemodynamically stable. On arrival noted to have stable vital signs and evaluated by ICU. He reported then being cold and having heartburn but currently has no complaints and is significantly drowsy. Denies taking any medications at home. Consumes about 6 beers/day but does not know last drink. Per OSH ED notes was seeing bugs on sheets.     Notable ED studies from [facility], ABG 7.57/79.1/29.5. Rapid strep positive, troponin negative, EtOH negative, CPK 80. UDS negative. Attempted to contact Ms. Sher (listed in demographics as emergency contact) and number disconnected. No number for his spouse is provided in transfer documentation.        Hospital Course:  Patient started on lactulose and rifaximin for HE and on rocephin empirically, seen by nephrology and recommend midodrine, albumin and octreotide to improve his MAP and his STEPHANIE, hepatology is on board for liver eval and recommend diagnostic paracentesis, his H/H dropped , consents were taken for paracentesis and PRBC    2/15 Hepatology has determined patient is not a candidate for liver transplant evaluation due insufficient health insurance,  ongoing substance abuse issues, poor insight into disease. Paracentesis performed with findings of high SAAG (>1.1) and few PMNs (10).       Interval History: no active issues overnight, his HR is controlled with BB, had 3 documented BM yesterday     Review of Systems   Constitutional: Negative for chills and fever.   Respiratory: Negative for cough, shortness of breath and wheezing.    Cardiovascular: Positive for leg swelling. Negative for chest pain and palpitations.   Gastrointestinal: Positive for abdominal distention (improved after paracentesis ). Negative for abdominal pain, constipation, nausea and vomiting.   Genitourinary: Positive for decreased urine volume. Negative for dysuria.   Musculoskeletal: Negative for neck pain.   Psychiatric/Behavioral: Positive for confusion. Negative for agitation.     Objective:     Vital Signs (Most Recent):  Temp: 98.7 °F (37.1 °C) (02/17/17 0400)  Pulse: 83 (02/17/17 0700)  Resp: 15 (02/17/17 0400)  BP: (!) 106/55 (02/17/17 0400)  SpO2: 96 % (02/17/17 0400) Vital Signs (24h Range):  Temp:  [97.7 °F (36.5 °C)-98.9 °F (37.2 °C)] 98.7 °F (37.1 °C)  Pulse:  [] 83  Resp:  [15-18] 15  SpO2:  [96 %-97 %] 96 %  BP: ()/(55-74) 106/55     Weight: 81.6 kg (180 lb)  Body mass index is 27.37 kg/(m^2).    Intake/Output Summary (Last 24 hours) at 02/17/17 0901  Last data filed at 02/17/17 0400   Gross per 24 hour   Intake             1300 ml   Output              600 ml   Net              700 ml      Physical Exam   Constitutional: No distress.   Cachectic    HENT:   Head: Normocephalic and atraumatic.   Eyes: Scleral icterus is present.   Pale conjunctivae    Neck: No JVD present.   Cardiovascular: Normal rate, regular rhythm and normal heart sounds.    No murmur heard.  Pulmonary/Chest: Effort normal and breath sounds normal. No respiratory distress. He has no wheezes. He has no rales.   Abdominal: Bowel sounds are normal. He exhibits no distension (after paracentesis ).  There is no tenderness.   Tense and distended   Musculoskeletal: He exhibits edema (+3 edema till thigh).   Neurological:   Disoriented x3  - astrexis ( improved since admission)   Skin: He is not diaphoretic.   + spider angioma        Significant Labs:   Bilirubin:   Recent Labs  Lab 02/13/17  0510 02/14/17  0514 02/15/17  0602 02/16/17  0522 02/17/17  0548   BILITOT 2.5* 3.8* 5.1* 6.0* 8.0*     CBC:   Recent Labs  Lab 02/16/17  0522 02/17/17  0548   WBC 5.81 6.26   HGB 8.1* 7.8*   HCT 23.6* 23.2*   PLT 35* 32*     CMP:   Recent Labs  Lab 02/16/17  0522 02/17/17  0548    131*   K 3.6 3.3*    99   CO2 27 26   GLU 91 109   BUN 10 10   CREATININE 0.8 1.0   CALCIUM 8.2* 7.7*   PROT 5.1* 5.1*   ALBUMIN 2.5* 2.5*   BILITOT 6.0* 8.0*   ALKPHOS 47* 65   AST 57* 84*   ALT 22 31   ANIONGAP 6* 6*   EGFRNONAA >60.0 >60.0           Assessment/Plan:      * Decompensated hepatic cirrhosis  -- likely due to his alcohol abuse and hep C  -Hep panel with hep C Ab positive. Negative hep B surface Ag  Hepatitis B Surface Ag Negative   Hep B C IgM Negative   Hep A IgM Negative   Hepatitis C Ab Positive (A)   -hep c PCR with 12,000 copies  - HIV ab1/2 negative.     -IgG elevated, anti smooth muscle ab negative, ceruloplasmin low  -Ferritin pending  -US Liver w/ doppler with micronodular cirrhosis   -paracentesis was done with 5 liter of fluid., WBC <250  -UA clear, CxR without acute cardiopulmonary abnormality, BCx NGTD  -finished rocephin 5 days.    MELD-Na score: 29 at 2/17/2017  5:48 AM  MELD score: 26 at 2/17/2017  5:48 AM  Calculated from:  Serum Creatinine: 1.0 mg/dL at 2/17/2017  5:48 AM  Serum Sodium: 131 mmol/L at 2/17/2017  5:48 AM  Total Bilirubin: 8.0 mg/dL at 2/17/2017  5:48 AM  INR(ratio): 2.8 at 2/17/2017  5:48 AM  Age: 51 years  - hepatology decline liver transplant evaluation.       Hyponatremia  -Most likely 2/2 decompensated cirrhosis,  - serum osm with 280 almost hypotonic range and overloaded with Yvonne <20  --> DDx: CHF or cirrhosis   - continue fluid restriction and low salt diet.       Hepatic encephalopathy  -Grade II-III, ammonia elevated, likely 2/2 decompensated cirrhosis  - still disoriented, but improved astrexis   -Lactulose TID, rifaximin  -Seizure precautions, fall precautions, aspiration precautions  -Neurochecks q4h  -Head CT was negative for acute events and utox is negative.     Coagulopathy  -Likely 2/2 liver dz,   - his INR is therapeutic for Afib      Thrombocytopenia, unspecified  -Likely 2/2 liver dz,       Hypokalemia  -Replaced      Macrocytic anemia  -Most likely due to chronic dz  -Vit B12, folate normal  - received PRBC 1 unit once, his H/H is stable.       Protein-calorie malnutrition, severe  -Likely 2/2 malnutrition from EtOH and liver dz  -Nutrition consulted  - on thiamine       ETOH abuse  -Reports heavy EtOH consumption, unsure of last drink, his wife mentioned frequent seizure after d/c of alcohol   -Thiamine, folate, multivitamin        Vitamin D insufficiency  -- vitamin D replacement       Moderate protein-calorie malnutrition        Hypomagnesemia  -- replace       Atrial fibrillation  -- don't know the duration of his Afib, since admission his HR in low 100,   -- today has one episode of  for 5 minutes, EKG with Afib +RVR  -- TSH was normal.  -- his INR is therapeutic for Afib due to his coagulopathic.  -- can't calculate his CHADVASC score due to his encephalopathy.  -- start low dose lopressor 12.5 mg BID while watching his blood pressure.        Chronic hepatitis C with cirrhosis  -- positive hep C ab and his PCR above 12,000.  -- defer treatment to outpatient setting with genotype testing.       Hypophosphatemia  -- replace      Acute renal failure, resolved as of 2/16/2017  -Cr. 2.0 on unknown baseline, unknown etiology possibly pre-renal vs ATN vs HRS  -Urine sodium < 20 with FENa with pre-renal picture, urine protein/creatinine ratio normal 0.11  -Retroperitoneal US  normal sizes  -UA unremarkable, still oliguric.   -- his cr improved to 1.6 with midodrine 10 mg TID, octreotide 10mcq TID, and albumin 25 gm daily, will d/c HRS protocol.  -- renal function now WNL; retaining urine, cleared with straight in-out cath  -- straight in-out cath prn and bladder scan prn.       Hypercalcemia, resolved as of 2/16/2017  - corrected Ca on admission is 12.4  - PTH is low normal --> unlikely hyperparathyroidism   - vitamin D is low -- > unlikely vitamin D toxicity or granulomatous diseases   - TSH is normal   --  PTHrP, UPEP, in process  SPEP: Decreased total protein.   Increased gamma globulin, polyclonal.   No paraprotein bands are detected; cirrhotic pattern.   -- repeated Ca is WNL    VTE Risk Mitigation         Ordered     Medium Risk of VTE  Once      02/11/17 0702     Place sequential compression device  Until discontinued      02/11/17 0702     Place AMA hose  Until discontinued      02/11/17 0702          Ryan Guthrie MD  Department of Hospital Medicine   [facility]

## 2017-02-17 NOTE — DISCHARGE SUMMARY
DISCHARGE SUMMARY  Hospital Medicine    [facility]: [facility]    Patient Name: Ricky Parsons  YOB: 1965    Admit Date: 2/10/2017    Discharge Date: 02/18/2017    Discharge Attending Physician: Daniela Whiteside MD     Diagnoses:  Active Hospital Problems    Diagnosis  POA    *Decompensated hepatic cirrhosis [K72.90]  Yes    Hypophosphatemia [E83.39]  Unknown    Hepatitis C [B19.20]  Yes    Atrial fibrillation [I48.91]  Yes    Chronic hepatitis C with cirrhosis [B18.2, K74.60]  Yes    Hypomagnesemia [E83.42]  Yes    Moderate protein-calorie malnutrition [E44.0]  Yes    Hyponatremia [E87.1]  Yes    Hepatic encephalopathy [K72.90]  Yes    Coagulopathy [D68.9]  Yes    Thrombocytopenia, unspecified [D69.6]  Yes    Hypokalemia [E87.6]  Yes    Macrocytic anemia [D53.9]  Yes    History of rapid strep test [Z92.89]  Not Applicable    Protein-calorie malnutrition, severe [E43]  Yes    ETOH abuse [F10.10]  Yes    Vitamin D insufficiency [E55.9]  Yes      Resolved Hospital Problems    Diagnosis Date Resolved POA    Acute renal failure [N17.9] 02/16/2017 Yes    Hypercalcemia [E83.52] 02/16/2017 Yes       Discharged Condition: admit problems have stabilized     HOSPITAL COURSE:    Initial Presentation:     Mr. Parsons is a 50 yo male no reported PMH per pt, per OSH records PMH CVA and wheelchair bound who presents as a transfer from [facility] per documentation there after a fall from chair and generalized weakness. Was found to be disoriented with abdominal distension and ascites so transferred to [facility] [place]. Found to be hyponatremic with a lactic acidosis to 3.3 there but hemodynamically stable. On arrival noted to have stable vital signs and evaluated by ICU. He reported then being cold and having heartburn but currently has no complaints and is significantly drowsy. Denies taking any medications at home. Consumes about 6 beers/day but does not know last  drink. Per OSH ED notes was seeing bugs on sheets.      Notable ED studies from [place], ABG 7.57/79.1/29.5. Rapid strep positive, troponin negative, EtOH negative, CPK 80. UDS negative. Attempted to contact Ms. Sher (listed in demographics as emergency contact) and number disconnected. No number for his spouse is provided in transfer documentation.       Course of Principle Problem for Admission:    Patient initial vitals were stable except blood pressure was in the low side and mild tachycardia, given one bolus of IVF and started on rocephin, his initial labs, elevated Cr 2 with unknown baseline, elevated ammonia, hyponatremic, thrombocytopenic, coagulopathic and macrocytic anemia, no evidence of overt GI bleeding, no emesis, patient was admitted under hospital team medicine for further workup and management of his hepatic encephalopathic and also for liver transplant evaluation, started on lactulose & rifaximin for HE, urine studies suggest pre-renal cause of his STEPHANIE, started on HRS protocol (midodrine, octreotide injection and albumin q6h), consent was taken from the step-daughter (because the patient was confused) given one unit of PRBC and then his H/H stabilized, paracentesis was done with 5 liter of fluid withdrawn, ascitic fluid showed normal WBC count, so rocephin was discontinued after 5 days, with HRS protocol his creatinine back to baseline, hyponatremia workup was hypotonic, with overload state and Yvonne<20 suggesting cirrhotic picture, that was improved with salt and fluid restriction, regarding his anemia found to have iron deficiency and giving iron supplements, also found to have vitamin D deficiency and giving supplements, regarding his liver condition US was done and was with micronodular cirrhosis, and found to have positive hep C antibody, Hep C PCR above 12,000 copies, seen by hepatology for liver transplant workup and declined due to his current alcohol consumption and psychosocial support.  The patient sometimes had HR of 160 for few minutes that resolved and EKG was done and found to be in Afib, started on low dose of lopressor that improved his HR, no need for anticoagulation due to his coagulopathic state with INR in 2-3. With above mentioned treatments his confusion and agitation got better, his STEPHANIE resolved and he is medical stable to be discharge, the optimal disposition for him was nursing home but his wife want the hospital to refer her for the same place (cause she has hx of stroke and want to be him) our  explained that we can't send her papers to the same NH and she or her family can send her papers, when we asked the patients regarding to go to the NH alone, he said he want to be discharge home to be with her, we tried to ask for home with health orders but couldn't do to his insurance.   Also patient refused EGD to screen him for varices.     Other Medical Problems Addressed in the Hospital:    Decompensated hepatic cirrhosis  -- likely due to his alcohol abuse and hep C  -Hep panel with hep C Ab positive. Negative hep B surface Ag  Hepatitis B Surface Ag Negative   Hep B C IgM Negative   Hep A IgM Negative   Hepatitis C Ab Positive (A)   -hep c PCR with 12,000 copies  - HIV ab1/2 negative.      -IgG elevated, anti smooth muscle ab negative, ceruloplasmin low  -Ferritin pending  -US Liver w/ doppler with micronodular cirrhosis   -paracentesis was done with 5 liter of fluid., WBC <250  -UA clear, CxR without acute cardiopulmonary abnormality, BCx NGTD  -finished rocephin 5 days.     MELD-Na score: 29 at 2/17/2017 5:48 AM  MELD score: 26 at 2/17/2017 5:48 AM  Calculated from:  Serum Creatinine: 1.0 mg/dL at 2/17/2017 5:48 AM  Serum Sodium: 131 mmol/L at 2/17/2017 5:48 AM  Total Bilirubin: 8.0 mg/dL at 2/17/2017 5:48 AM  INR(ratio): 2.8 at 2/17/2017 5:48 AM  Age: 51 years  - hepatology decline liver transplant evaluation.         Hyponatremia  -Most likely 2/2 decompensated  cirrhosis,  - serum osm with 280 almost hypotonic range and overloaded with Yvonne <20 --> DDx: CHF or cirrhosis   - continue fluid restriction and low salt diet.         Hepatic encephalopathy  -Grade II-III, ammonia elevated, likely 2/2 decompensated cirrhosis  - still disoriented, but improved astrexis   -Lactulose TID, rifaximin  -Seizure precautions, fall precautions, aspiration precautions  -Neurochecks q4h  -Head CT was negative for acute events and utox is negative.      Coagulopathy  -Likely 2/2 liver dz,   - his INR is therapeutic for Afib        Thrombocytopenia, unspecified  -Likely 2/2 liver dz,         Hypokalemia  -Replaced        Macrocytic anemia  -Most likely due to chronic dz  -Vit B12, folate normal  - received PRBC 1 unit once, his H/H is stable.         Protein-calorie malnutrition, severe  -Likely 2/2 malnutrition from EtOH and liver dz  -Nutrition consulted  - on thiamine         ETOH abuse  -Reports heavy EtOH consumption, unsure of last drink, his wife mentioned frequent seizure after d/c of alcohol   -Thiamine, folate, multivitamin           Vitamin D insufficiency  -- vitamin D replacement         Moderate protein-calorie malnutrition           Hypomagnesemia  -- replace         Atrial fibrillation  -- don't know the duration of his Afib, since admission his HR in low 100,   -- today has one episode of  for 5 minutes, EKG with Afib +RVR  -- TSH was normal.  -- his INR is therapeutic for Afib due to his coagulopathic.  -- can't calculate his CHADVASC score due to his encephalopathy.  -- start low dose lopressor 12.5 mg BID while watching his blood pressure.         Chronic hepatitis C with cirrhosis  -- positive hep C ab and his PCR above 12,000.  -- defer treatment to outpatient setting with genotype testing.         Hypophosphatemia  -- replace        Acute renal failure, resolved as of 2/16/2017  -Cr. 2.0 on unknown baseline, unknown etiology possibly pre-renal vs ATN vs HRS  -Urine  sodium < 20 with FENa with pre-renal picture, urine protein/creatinine ratio normal 0.11  -Retroperitoneal US normal sizes  -UA unremarkable, still oliguric.   -- his cr improved to 1.6 with midodrine 10 mg TID, octreotide 10mcq TID, and albumin 25 gm daily, will d/c HRS protocol.  -- renal function now WNL; retaining urine, cleared with straight in-out cath  -- straight in-out cath prn and bladder scan prn.         Hypercalcemia, resolved as of 2/16/2017  - corrected Ca on admission is 12.4  - PTH is low normal --> unlikely hyperparathyroidism   - vitamin D is low -- > unlikely vitamin D toxicity or granulomatous diseases   - TSH is normal   -- PTHrP, UPEP, in process  SPEP: Decreased total protein.   Increased gamma globulin, polyclonal.   No paraprotein bands are detected; cirrhotic pattern.   -- repeated Ca is WNL        CONSULTS: nephrology, hepatology    Other Pertinent Lab Findings:      CBC  LFT  Hep C ab and Hep C PCR  BMP  Vit D  INR  Ascitic fluid.         Pertinent/Significant Diagnostic Studies:    US liver  Large volume of ascites.    Micronodular contour of the liver with coarsened echotexture suggestive of cirrhosis.    EKG  Afib with RVR      Special Treatments/Procedures: paracentesis with 5 liter    Disposition:  Home     Future Scheduled Appointments:  No future appointments.          Last CBC/BMP/HgbA1c (if applicable):  Recent Results (from the past 336 hour(s))   CBC auto differential    Collection Time: 02/17/17  5:48 AM   Result Value Ref Range    WBC 6.26 3.90 - 12.70 K/uL    Hemoglobin 7.8 (L) 14.0 - 18.0 g/dL    Hematocrit 23.2 (L) 40.0 - 54.0 %    Platelets 32 (LL) 150 - 350 K/uL   CBC auto differential    Collection Time: 02/16/17  5:22 AM   Result Value Ref Range    WBC 5.81 3.90 - 12.70 K/uL    Hemoglobin 8.1 (L) 14.0 - 18.0 g/dL    Hematocrit 23.6 (L) 40.0 - 54.0 %    Platelets 35 (LL) 150 - 350 K/uL   CBC auto differential    Collection Time: 02/15/17  6:02 AM   Result Value Ref  Range    WBC 6.20 3.90 - 12.70 K/uL    Hemoglobin 8.4 (L) 14.0 - 18.0 g/dL    Hematocrit 24.7 (L) 40.0 - 54.0 %    Platelets 42 (L) 150 - 350 K/uL     No results found for this or any previous visit (from the past 336 hour(s)).  No results found for: HGBA1C    Discharge Medication List:     Medication List      START taking these medications          ergocalciferol 50,000 unit Cap   Commonly known as:  ERGOCALCIFEROL   Take 1 capsule (50,000 Units total) by mouth every 30 days.       lactulose 20 gram/30 mL Soln   Commonly known as:  CHRONULAC   Take 30 mLs (20 g total) by mouth 3 (three) times daily as needed (to acheive 2-3 BM per day).       metoprolol tartrate 25 MG tablet   Commonly known as:  LOPRESSOR   Take 0.5 tablets (12.5 mg total) by mouth 2 (two) times daily.       multivitamin tablet   Commonly known as:  THERAGRAN   Take 1 tablet by mouth once daily.       tamsulosin 0.4 mg Cp24   Commonly known as:  FLOMAX   Take 1 capsule (0.4 mg total) by mouth once daily.            Where to Get Your Medications      These medications were sent to [facility] [address] [place] [address], [place] - [address]  [address], [place] [address]     Phone:  [phone]     ergocalciferol 50,000 unit Cap    lactulose 20 gram/30 mL Soln    metoprolol tartrate 25 MG tablet    tamsulosin 0.4 mg Cp24         You can get these medications from any pharmacy     You don't need a prescription for these medications     multivitamin tablet             Patient Instructions:  No discharge procedures on file.    Signing Physician:  Ryan Guthrie MD

## 2017-02-17 NOTE — ASSESSMENT & PLAN NOTE
-Most likely 2/2 decompensated cirrhosis,  - serum osm with 280 almost hypotonic range and overloaded with Yvonne <20 --> DDx: CHF or cirrhosis   - continue fluid restriction and low salt diet.

## 2017-02-17 NOTE — PROGRESS NOTES
Spoke with the Nannette HERNDON regarding d/c plan.  Pt does not appear to be appropriate as of yet to be discharged. MD is paged, waiting for call back.

## 2017-02-17 NOTE — PROGRESS NOTES
Dr. Stevo Whiteside, aware of the low BP, states she wants patients blood pressure to stay low due to pt condition.

## 2017-02-17 NOTE — PLAN OF CARE
"SW was looking for nursing home placement in [place] area but pt have multiple denials. Per JUAN Johnson, pt informed the team that pt does not want nursing home placement if his wife can not be placed with him. Therefore, pt is discharging today back to [place], [place]. Pt's sister in law, Maxine Dunbar [phone], is informed of discharge today but does not agree that pt should discharge today because pt's home condition are "unliveable". Maxine states, " pt would sit his his chair, poop, and pee without cleaning. Pt has multiple pets who are unkept. Maxine also express concerned that they have not been paying taxes on their trailer and they will possibly loss their trailer soon. YANICK explained that pt is orientated therefore pt has the right to refuse services and self determinate. Pt's sister in law verbalize understanding.      Yanick arranged transportation with [facility] [employer] ([phone]) to transport pt and pt's wife home. [employer] is running behind due to parade traffic.    YANICK filed a report for Adult protective services in [place] and shared the above information with Ivette Cardenas [phone]     Nannette Quintero, [facility]  [phone]  "

## 2017-02-17 NOTE — SUBJECTIVE & OBJECTIVE
Interval History: no active issues overnight, his HR is controlled with BB, had 3 documented BM yesterday     Review of Systems   Constitutional: Negative for chills and fever.   Respiratory: Negative for cough, shortness of breath and wheezing.    Cardiovascular: Positive for leg swelling. Negative for chest pain and palpitations.   Gastrointestinal: Positive for abdominal distention (improved after paracentesis ). Negative for abdominal pain, constipation, nausea and vomiting.   Genitourinary: Positive for decreased urine volume. Negative for dysuria.   Musculoskeletal: Negative for neck pain.   Psychiatric/Behavioral: Positive for confusion. Negative for agitation.     Objective:     Vital Signs (Most Recent):  Temp: 98.7 °F (37.1 °C) (02/17/17 0400)  Pulse: 83 (02/17/17 0700)  Resp: 15 (02/17/17 0400)  BP: (!) 106/55 (02/17/17 0400)  SpO2: 96 % (02/17/17 0400) Vital Signs (24h Range):  Temp:  [97.7 °F (36.5 °C)-98.9 °F (37.2 °C)] 98.7 °F (37.1 °C)  Pulse:  [] 83  Resp:  [15-18] 15  SpO2:  [96 %-97 %] 96 %  BP: ()/(55-74) 106/55     Weight: 81.6 kg (180 lb)  Body mass index is 27.37 kg/(m^2).    Intake/Output Summary (Last 24 hours) at 02/17/17 0901  Last data filed at 02/17/17 0400   Gross per 24 hour   Intake             1300 ml   Output              600 ml   Net              700 ml      Physical Exam   Constitutional: No distress.   Cachectic    HENT:   Head: Normocephalic and atraumatic.   Eyes: Scleral icterus is present.   Pale conjunctivae    Neck: No JVD present.   Cardiovascular: Normal rate, regular rhythm and normal heart sounds.    No murmur heard.  Pulmonary/Chest: Effort normal and breath sounds normal. No respiratory distress. He has no wheezes. He has no rales.   Abdominal: Bowel sounds are normal. He exhibits no distension (after paracentesis ). There is no tenderness.   Tense and distended   Musculoskeletal: He exhibits edema (+3 edema till thigh).   Neurological:   Disoriented x3  -  astrexis ( improved since admission)   Skin: He is not diaphoretic.   + spider angioma        Significant Labs:   Bilirubin:   Recent Labs  Lab 02/13/17  0510 02/14/17  0514 02/15/17  0602 02/16/17  0522 02/17/17  0548   BILITOT 2.5* 3.8* 5.1* 6.0* 8.0*     CBC:   Recent Labs  Lab 02/16/17  0522 02/17/17  0548   WBC 5.81 6.26   HGB 8.1* 7.8*   HCT 23.6* 23.2*   PLT 35* 32*     CMP:   Recent Labs  Lab 02/16/17  0522 02/17/17  0548    131*   K 3.6 3.3*    99   CO2 27 26   GLU 91 109   BUN 10 10   CREATININE 0.8 1.0   CALCIUM 8.2* 7.7*   PROT 5.1* 5.1*   ALBUMIN 2.5* 2.5*   BILITOT 6.0* 8.0*   ALKPHOS 47* 65   AST 57* 84*   ALT 22 31   ANIONGAP 6* 6*   EGFRNONAA >60.0 >60.0

## 2017-02-17 NOTE — PROGRESS NOTES
02/17/17 1553   Vital Signs   Pulse 101   Heart Rate Source SpO2   Resp 17   Pulse Oximetry Type Intermittent   O2 Device (Oxygen Therapy) room air   BP (!) 87/54   BP Location Right arm   BP Method Automatic   Patient Position Lying   Md was notified regarding low blood pressure. Low hgb and, no midodrine scheduled.

## 2017-02-17 NOTE — NURSING
Called nurse regarding discharge orders, nurse has already been in contact with SW and team, states pt is not seem appropriate for discharge at this time. Nurse to call me if discharge is discontinued or if it will stay in place.

## 2017-02-17 NOTE — PROGRESS NOTES
Spoke with Dr. Stevo Whiteside regarding d/c plan. Dr. Stevo Whiteside stated pt is competent enough to make decision to whether he wants to go home or Nursing home. Since pt has refused to go to the nursing home, pt is being d/c'd home. Transportation will be arranged by the .

## 2017-02-18 VITALS
TEMPERATURE: 99 F | BODY MASS INDEX: 27.28 KG/M2 | RESPIRATION RATE: 16 BRPM | DIASTOLIC BLOOD PRESSURE: 58 MMHG | SYSTOLIC BLOOD PRESSURE: 95 MMHG | WEIGHT: 180 LBS | HEART RATE: 104 BPM | OXYGEN SATURATION: 95 % | HEIGHT: 68 IN

## 2017-02-18 LAB
ALBUMIN SERPL BCP-MCNC: 2.3 G/DL
ALP SERPL-CCNC: 76 U/L
ALT SERPL W/O P-5'-P-CCNC: 33 U/L
ANION GAP SERPL CALC-SCNC: 6 MMOL/L
AST SERPL-CCNC: 87 U/L
BACTERIA UR CULT: NO GROWTH
BASOPHILS # BLD AUTO: 0.03 K/UL
BASOPHILS NFR BLD: 0.5 %
BILIRUB SERPL-MCNC: 8.3 MG/DL
BUN SERPL-MCNC: 14 MG/DL
CALCIUM SERPL-MCNC: 7.3 MG/DL
CHLORIDE SERPL-SCNC: 100 MMOL/L
CO2 SERPL-SCNC: 25 MMOL/L
CREAT SERPL-MCNC: 1.3 MG/DL
DIFFERENTIAL METHOD: ABNORMAL
EOSINOPHIL # BLD AUTO: 0.1 K/UL
EOSINOPHIL NFR BLD: 1.8 %
ERYTHROCYTE [DISTWIDTH] IN BLOOD BY AUTOMATED COUNT: 22.7 %
EST. GFR  (AFRICAN AMERICAN): >60 ML/MIN/1.73 M^2
EST. GFR  (NON AFRICAN AMERICAN): >60 ML/MIN/1.73 M^2
GLUCOSE SERPL-MCNC: 90 MG/DL
HCT VFR BLD AUTO: 19.9 %
HGB BLD-MCNC: 7.1 G/DL
INR PPP: 2.9
LYMPHOCYTES # BLD AUTO: 1.2 K/UL
LYMPHOCYTES NFR BLD: 20 %
MAGNESIUM SERPL-MCNC: 1.6 MG/DL
MCH RBC QN AUTO: 35.9 PG
MCHC RBC AUTO-ENTMCNC: 35.7 %
MCV RBC AUTO: 101 FL
MONOCYTES # BLD AUTO: 0.7 K/UL
MONOCYTES NFR BLD: 10.6 %
NEUTROPHILS # BLD AUTO: 4.1 K/UL
NEUTROPHILS NFR BLD: 67.1 %
PHOSPHATE SERPL-MCNC: 2.7 MG/DL
PLATELET # BLD AUTO: 29 K/UL
PMV BLD AUTO: 11.5 FL
POCT GLUCOSE: 131 MG/DL (ref 70–110)
POCT GLUCOSE: 135 MG/DL (ref 70–110)
POTASSIUM SERPL-SCNC: 3.4 MMOL/L
PROT SERPL-MCNC: 4.8 G/DL
PROTHROMBIN TIME: 29 SEC
RBC # BLD AUTO: 1.98 M/UL
SODIUM SERPL-SCNC: 131 MMOL/L
WBC # BLD AUTO: 6.2 K/UL

## 2017-02-18 PROCEDURE — 83735 ASSAY OF MAGNESIUM: CPT

## 2017-02-18 PROCEDURE — 25000003 PHARM REV CODE 250: Performed by: INTERNAL MEDICINE

## 2017-02-18 PROCEDURE — 25000003 PHARM REV CODE 250: Performed by: HOSPITALIST

## 2017-02-18 PROCEDURE — 85025 COMPLETE CBC W/AUTO DIFF WBC: CPT

## 2017-02-18 PROCEDURE — 85610 PROTHROMBIN TIME: CPT

## 2017-02-18 PROCEDURE — 25000003 PHARM REV CODE 250: Performed by: STUDENT IN AN ORGANIZED HEALTH CARE EDUCATION/TRAINING PROGRAM

## 2017-02-18 PROCEDURE — 80053 COMPREHEN METABOLIC PANEL: CPT

## 2017-02-18 PROCEDURE — 36415 COLL VENOUS BLD VENIPUNCTURE: CPT

## 2017-02-18 PROCEDURE — 63600175 PHARM REV CODE 636 W HCPCS: Performed by: HOSPITALIST

## 2017-02-18 PROCEDURE — 84100 ASSAY OF PHOSPHORUS: CPT

## 2017-02-18 RX ADMIN — PANTOPRAZOLE SODIUM 40 MG: 40 TABLET, DELAYED RELEASE ORAL at 08:02

## 2017-02-18 RX ADMIN — TAMSULOSIN HYDROCHLORIDE 0.4 MG: 0.4 CAPSULE ORAL at 08:02

## 2017-02-18 RX ADMIN — THIAMINE HYDROCHLORIDE 100 MG: 100 INJECTION, SOLUTION INTRAMUSCULAR; INTRAVENOUS at 03:02

## 2017-02-18 RX ADMIN — Medication 12.5 MG: at 08:02

## 2017-02-18 RX ADMIN — LACTULOSE 20 G: 10 SOLUTION ORAL at 08:02

## 2017-02-18 RX ADMIN — THERA TABS 1 TABLET: TAB at 08:02

## 2017-02-18 RX ADMIN — FOLIC ACID 1 MG: 1 TABLET ORAL at 08:02

## 2017-02-18 RX ADMIN — RIFAXIMIN 550 MG: 550 TABLET ORAL at 08:02

## 2017-02-18 NOTE — PLAN OF CARE
Problem: Fall Risk (Adult)  Intervention: Safety Precautions  Pt on bedrest. [employer] camera in place for safety surveillance - did not alarm once this shift - pt increasingly calm & compliant with fall precautions. Bed alarm set, frequent checks completed. Remains free of falls.       Problem: Patient Care Overview  Goal: Plan of Care Review  Outcome: Ongoing (interventions implemented as appropriate)  Pt hypotensive (lowest 80/56 - see previous notes) & occasionally tachy (highest 106). On RA. PRN in/out catheter completed at 0300 since had not voided all shift - bladder scan revealed >300ccs, in/out produced 400ccs of az urine. Issues with discharge disposition this shift - please see previous notes for details.     Problem: Liver Failure, Acute/Chronic (Adult)  Intervention: Promote Neurologic Homeostasis  AOx2 - calm & cooperative. Neuro checks completed Q4 with no changes. Wife remains at bedside but she struggles with understanding care of pt. Thiamine administered per orders. No BM previous 2 shifts despite lactulose being administered. Denies pain & pruritis. Skin tears on UE - bleeding controlled with mepilex dressings.

## 2017-02-18 NOTE — PLAN OF CARE
Patient seen and examined this morning. PE remains unchanged from yesterday. Discharge held by nursing staff as ambulance arrived at 11:30 pm for transportation. Will be discharged today.  Daniela Whiteside MD  [facility] Medicine Staff  [phone]

## 2017-02-18 NOTE — NURSING
"On-call notified of pt's BP of 80/54 - states "I don't feel comfortable giving fluids at this time due to his ascites, please call if his BP is lower at the next set of VS to re-evaluate." House supervisor stated that we've exhausted our resources at this point on trying to contact social work for help coordinating this pt's care/discharge & we will keep him overnight. Pt feels comfortable going home in the morning. RN feels this is more appropriate.   "

## 2017-02-18 NOTE — NURSING
On-call social work paged x4 for assistance with this pt's discharge/transport situation with no call back. Transport arrived for pt with wheelchair (this RN hasn't seen this pt walk out of bed the entire time he's been hospitalized) & pt & wife both stating they don't feel comfortable leaving the hospital at this time of night while it's raining to go to their trailer that has no electricity. This RN feels concern regarding the discharge situation/disposition. Charge RN notified of situation who has notified the house supervisor. Transport on hold at this point awaiting call back.

## 2017-02-18 NOTE — NURSING
"Called MD to report BP of 86/58 (has been low all day) - no new orders. Also stated that pt feels it's too late in the evening to go home. RN voiced concern about pt's disposition & whether or not he's ready to be discharged. On-call stated "we'll keep him overnight." No orders for this at this time. Charge RN called to obtain information on status of pt, charge RN will be looking into the situation further. Will continue to monitor pt.   "

## 2017-02-18 NOTE — PLAN OF CARE
Problem: Patient Care Overview  Goal: Plan of Care Review  Outcome: Ongoing (interventions implemented as appropriate)  Safety: call light in reach, patient oriented to room & instructed how to notify nurse if assistance is needed, current questions/concerns addressed, bed in lowest position with wheels locked & side rails up X 3. Pt and family were educated regarding fall precaution and taking appropriate action. Pt ambulates to the bathroom Activity: is up with 2 assist.  Neurological: Oriented x2, pleasantly confused Respiratory: on RA Cardiac: BP hypotensive, pt asymptomatic. HR stable. Afebrile this shift. Intake/Output: No BM today, urined x2. Diet intake adequate Pain: Denied any pain Skin: bruised and scabbed. Other: Pt is being d/c'd home, waiting on transportation. Pt tolerates room air, resume diet as directed, voiding, and requires assistance with self care. Vital signs stable, except bp, MD aware. IV removed, catheter intact, bleeding controlled, site covered with dry gauze and tape.

## 2017-02-18 NOTE — NURSING
Spoke with Dr. Flannery's Resident regarding am labs and if ok to still discharge home today. Ok per Dr. Stevo Whiteside to discharge home. Set up transport through [facility] to transport patient home. Informed Primary Care nurse of above

## 2017-02-20 NOTE — PLAN OF CARE
Pt declined NH placement 2/17 d/t that fact that spouse could not be at facility. Pt's spouse was expected to go to sister's home and SW was going to provide family w/ resources to assist w/ spouse's NH placement. Since pt was not cooperative w/ placement - MD proceeded w/ d/c to home. Pt & spouse did need assistance w/ ride - per notes, delay in d/c to 2/18 occurred r/t weather, concern for pt safety in d/c'ing late in the day & pt has no utilites, etc. Pt was successfully d/c'ed the AM of 2/18. YANICK did place a call to [place] [facility]. This pt has no PCP and stated during admit assessment that only goes to ED for care/follow ups and pt's phone works only if mins are loaded.      02/20/17 1054   Final Note   Assessment Type Final Discharge Note   Discharge Disposition Home   What phone number can be called within the next 1-3 days to see how you are doing after discharge? [phone]   Hospital Follow Up  Appt(s) scheduled? No  (pt w/ no PCP - states only goes to ED to see an MD)   Discharge/Hospital Encounter Summary to (non-[facility]) PCP No  (D/C summary faxed to insurance)   Referral to / orders for Home Health Complete? n/a   Right [facility] Referral Info   Post Acute Recommendation No Care

## 2017-02-21 LAB — BACTERIA SPEC ANAEROBE CULT: NORMAL

## 2018-01-25 NOTE — ASSESSMENT & PLAN NOTE
-Grade II-III, ammonia elevated, likely 2/2 decompensated cirrhosis  -Lactulose TID, rifaximin  -Seizure precautions, fall precautions, aspiration precautions  -Neurochecks q4h  -Head CT pending to evaluate for cerebral edema   Total Thyroid Lobectomy